# Patient Record
Sex: MALE | Race: WHITE | Employment: FULL TIME | URBAN - NONMETROPOLITAN AREA
[De-identification: names, ages, dates, MRNs, and addresses within clinical notes are randomized per-mention and may not be internally consistent; named-entity substitution may affect disease eponyms.]

---

## 2017-07-26 LAB
ALBUMIN SERPL-MCNC: 4.5 G/DL
ALP BLD-CCNC: 53 U/L
ALT SERPL-CCNC: 24 U/L
ANION GAP SERPL CALCULATED.3IONS-SCNC: NORMAL MMOL/L
AST SERPL-CCNC: 21 U/L
AVERAGE GLUCOSE: 108
BILIRUB SERPL-MCNC: 0.4 MG/DL (ref 0.1–1.4)
BUN BLDV-MCNC: 20 MG/DL
CALCIUM SERPL-MCNC: 9.4 MG/DL
CHLORIDE BLD-SCNC: 99 MMOL/L
CHOLESTEROL, TOTAL: 145 MG/DL
CHOLESTEROL/HDL RATIO: NORMAL
CO2: 25.4 MMOL/L
CREAT SERPL-MCNC: 0.8 MG/DL
GFR CALCULATED: NORMAL
GLUCOSE BLD-MCNC: 123 MG/DL
HBA1C MFR BLD: 5.4 %
HDLC SERPL-MCNC: 49 MG/DL (ref 35–70)
LDL CHOLESTEROL CALCULATED: 80 MG/DL (ref 0–160)
POTASSIUM SERPL-SCNC: 4.1 MMOL/L
PROSTATE SPECIFIC ANTIGEN: 2.27 NG/ML
SODIUM BLD-SCNC: 137 MMOL/L
TOTAL PROTEIN: 6.9
TRIGL SERPL-MCNC: 82 MG/DL
VLDLC SERPL CALC-MCNC: 16 MG/DL

## 2017-12-07 ENCOUNTER — TELEPHONE (OUTPATIENT)
Dept: UROLOGY | Age: 57
End: 2017-12-07

## 2018-01-25 ENCOUNTER — OFFICE VISIT (OUTPATIENT)
Dept: UROLOGY | Age: 58
End: 2018-01-25
Payer: COMMERCIAL

## 2018-01-25 VITALS
DIASTOLIC BLOOD PRESSURE: 58 MMHG | SYSTOLIC BLOOD PRESSURE: 114 MMHG | HEIGHT: 69 IN | BODY MASS INDEX: 33.33 KG/M2 | WEIGHT: 225 LBS

## 2018-01-25 DIAGNOSIS — R97.20 ELEVATED PSA: Primary | ICD-10-CM

## 2018-01-25 LAB
ALBUMIN SERPL-MCNC: 4.9 G/DL
ALP BLD-CCNC: 49 U/L
ALT SERPL-CCNC: 28 U/L
ANION GAP SERPL CALCULATED.3IONS-SCNC: NORMAL MMOL/L
AST SERPL-CCNC: 23 U/L
AVERAGE GLUCOSE: 120
BILIRUB SERPL-MCNC: 0.5 MG/DL (ref 0.1–1.4)
BILIRUBIN, POC: NORMAL
BLOOD URINE, POC: NORMAL
BUN BLDV-MCNC: 20 MG/DL
CALCIUM SERPL-MCNC: 9.6 MG/DL
CHLORIDE BLD-SCNC: 99 MMOL/L
CLARITY, POC: CLEAR
CO2: 27 MMOL/L
COLOR, POC: YELLOW
CREAT SERPL-MCNC: 0.8 MG/DL
GFR CALCULATED: NORMAL
GLUCOSE BLD-MCNC: 103 MG/DL
GLUCOSE URINE, POC: NORMAL
HBA1C MFR BLD: 5.8 %
KETONES, POC: NORMAL
LEUKOCYTE EST, POC: NORMAL
NITRITE, POC: NORMAL
PH, POC: 5
POST VOID RESIDUAL (PVR): 9 ML
POTASSIUM SERPL-SCNC: 4.9 MMOL/L
PROSTATE SPECIFIC ANTIGEN: 3.09 NG/ML
PROTEIN, POC: NORMAL
SODIUM BLD-SCNC: 138 MMOL/L
SPECIFIC GRAVITY, POC: 1.02
TOTAL PROTEIN: 7.1
UROBILINOGEN, POC: 0.2

## 2018-01-25 PROCEDURE — 81003 URINALYSIS AUTO W/O SCOPE: CPT | Performed by: NURSE PRACTITIONER

## 2018-01-25 PROCEDURE — 99213 OFFICE O/P EST LOW 20 MIN: CPT | Performed by: NURSE PRACTITIONER

## 2018-01-25 PROCEDURE — 51798 US URINE CAPACITY MEASURE: CPT | Performed by: NURSE PRACTITIONER

## 2018-01-25 ASSESSMENT — ENCOUNTER SYMPTOMS
VOMITING: 0
ABDOMINAL PAIN: 0
NAUSEA: 0

## 2018-01-25 NOTE — PROGRESS NOTES
Subjective:      Patient ID: Galileo Melgar is a 63 y.o. male. JEAN Portillo is here for follow-up of prostate nodule. He had a TRUS biopsy on 4/4/16 for PSA of 2.64 and prostate nodule. The pathology was negative for malignancy. The prostate size was 62.5 cc and there was a hypoechoic area on the left. There is no family history of prostate cancer. He has nocturia 2-3 times nightly. His AUA score is 3. He takes a diuretic prior to bedtime. 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 Most recent PSA is 2.27 from July 2017. Review of Systems   Constitutional: Negative for chills and fever. Gastrointestinal: Negative for abdominal pain, nausea and vomiting. Genitourinary: Negative for dysuria, frequency, hematuria and urgency. Nocturia 3 times nightly. Objective:   Physical Exam   Constitutional: He is oriented to person, place, and time. He appears well-developed and well-nourished. HENT:   Head: Normocephalic and atraumatic. Right Ear: External ear normal.   Left Ear: External ear normal.   Nose: Nose normal.   Eyes: Conjunctivae are normal.   Pulmonary/Chest: Effort normal.   Abdominal: Soft. Neurological: He is alert and oriented to person, place, and time. Skin: Skin is warm and dry. Psychiatric: He has a normal mood and affect.  His behavior is normal. Judgment and thought content normal.       Lab Results   Component Value Date    PSA 2.27 07/26/2017    PSA 2.64 03/04/2016    PSA 2.64 03/04/2016     Results for POC orders placed in visit on 01/25/18   POCT Urinalysis No Micro (Auto)   Result Value Ref Range    Color, UA yellow     Clarity, UA clear     Glucose, UA POC neg     Bilirubin, UA neg     Ketones, UA neg     Spec Grav, UA 1.020     Blood, UA POC Trace-lysed     pH, UA 5.0     Protein, UA POC neg     Urobilinogen, UA 0.2     Leukocytes, UA neg     Nitrite, UA neg        Assessment:

## 2018-02-02 ENCOUNTER — TELEPHONE (OUTPATIENT)
Dept: UROLOGY | Age: 58
End: 2018-02-02

## 2019-01-16 LAB
ALBUMIN SERPL-MCNC: 4.8 G/DL
ALP BLD-CCNC: 52 U/L
ALT SERPL-CCNC: 31 U/L
ANION GAP SERPL CALCULATED.3IONS-SCNC: NORMAL MMOL/L
AST SERPL-CCNC: 25 U/L
AVERAGE GLUCOSE: 117
BILIRUB SERPL-MCNC: 0.5 MG/DL (ref 0.1–1.4)
BUN BLDV-MCNC: 20 MG/DL
CALCIUM SERPL-MCNC: 10.1 MG/DL
CHLORIDE BLD-SCNC: 99 MMOL/L
CO2: 22 MMOL/L
CREAT SERPL-MCNC: 0.8 MG/DL
CREATININE, URINE: 157.5
GFR CALCULATED: NORMAL
GLUCOSE BLD-MCNC: 102 MG/DL
HBA1C MFR BLD: 5.7 %
MICROALBUMIN/CREAT 24H UR: NORMAL MG/G{CREAT}
MICROALBUMIN/CREAT UR-RTO: NORMAL
POTASSIUM SERPL-SCNC: 4.5 MMOL/L
PROSTATE SPECIFIC ANTIGEN: 2.53 NG/ML
SODIUM BLD-SCNC: 139 MMOL/L
TOTAL PROTEIN: 7.2

## 2019-01-24 ENCOUNTER — OFFICE VISIT (OUTPATIENT)
Dept: UROLOGY | Age: 59
End: 2019-01-24
Payer: COMMERCIAL

## 2019-01-24 VITALS
WEIGHT: 229 LBS | BODY MASS INDEX: 31.02 KG/M2 | HEIGHT: 72 IN | SYSTOLIC BLOOD PRESSURE: 128 MMHG | DIASTOLIC BLOOD PRESSURE: 72 MMHG

## 2019-01-24 DIAGNOSIS — N40.2 PROSTATE NODULE: ICD-10-CM

## 2019-01-24 DIAGNOSIS — R35.0 BENIGN PROSTATIC HYPERPLASIA WITH URINARY FREQUENCY: Primary | ICD-10-CM

## 2019-01-24 DIAGNOSIS — R35.0 URINARY FREQUENCY: ICD-10-CM

## 2019-01-24 DIAGNOSIS — N40.1 BENIGN PROSTATIC HYPERPLASIA WITH URINARY FREQUENCY: Primary | ICD-10-CM

## 2019-01-24 LAB
BILIRUBIN, POC: NORMAL
BLOOD URINE, POC: NORMAL
CLARITY, POC: CLEAR
COLOR, POC: YELLOW
GLUCOSE URINE, POC: NORMAL
KETONES, POC: NORMAL
LEUKOCYTE EST, POC: NORMAL
NITRITE, POC: NORMAL
PH, POC: 6.5
POST VOID RESIDUAL (PVR): 24 ML
PROTEIN, POC: NORMAL
SPECIFIC GRAVITY, POC: 1.01
UROBILINOGEN, POC: 0.2

## 2019-01-24 PROCEDURE — 81003 URINALYSIS AUTO W/O SCOPE: CPT | Performed by: NURSE PRACTITIONER

## 2019-01-24 PROCEDURE — 51798 US URINE CAPACITY MEASURE: CPT | Performed by: NURSE PRACTITIONER

## 2019-01-24 PROCEDURE — 99213 OFFICE O/P EST LOW 20 MIN: CPT | Performed by: NURSE PRACTITIONER

## 2019-01-24 RX ORDER — LEVOTHYROXINE SODIUM 0.12 MG/1
125 TABLET ORAL DAILY
COMMUNITY

## 2019-01-24 ASSESSMENT — ENCOUNTER SYMPTOMS
NAUSEA: 0
ABDOMINAL PAIN: 0
VOMITING: 0

## 2020-01-11 LAB — PROSTATE SPECIFIC ANTIGEN: 2.97 NG/ML

## 2020-02-05 ENCOUNTER — OFFICE VISIT (OUTPATIENT)
Dept: UROLOGY | Age: 60
End: 2020-02-05
Payer: COMMERCIAL

## 2020-02-05 VITALS
DIASTOLIC BLOOD PRESSURE: 72 MMHG | WEIGHT: 236 LBS | BODY MASS INDEX: 31.97 KG/M2 | HEIGHT: 72 IN | SYSTOLIC BLOOD PRESSURE: 132 MMHG

## 2020-02-05 LAB
BILIRUBIN, POC: NORMAL
BLOOD URINE, POC: NORMAL
CLARITY, POC: CLEAR
COLOR, POC: YELLOW
GLUCOSE URINE, POC: NORMAL
KETONES, POC: NORMAL
LEUKOCYTE EST, POC: NORMAL
NITRITE, POC: NORMAL
PH, POC: 5.5
PROTEIN, POC: NORMAL
SPECIFIC GRAVITY, POC: 1.02
UROBILINOGEN, POC: 0.2

## 2020-02-05 PROCEDURE — G8484 FLU IMMUNIZE NO ADMIN: HCPCS | Performed by: NURSE PRACTITIONER

## 2020-02-05 PROCEDURE — G8428 CUR MEDS NOT DOCUMENT: HCPCS | Performed by: NURSE PRACTITIONER

## 2020-02-05 PROCEDURE — 81003 URINALYSIS AUTO W/O SCOPE: CPT | Performed by: NURSE PRACTITIONER

## 2020-02-05 PROCEDURE — 1036F TOBACCO NON-USER: CPT | Performed by: NURSE PRACTITIONER

## 2020-02-05 PROCEDURE — G8417 CALC BMI ABV UP PARAM F/U: HCPCS | Performed by: NURSE PRACTITIONER

## 2020-02-05 PROCEDURE — 3017F COLORECTAL CA SCREEN DOC REV: CPT | Performed by: NURSE PRACTITIONER

## 2020-02-05 PROCEDURE — 99213 OFFICE O/P EST LOW 20 MIN: CPT | Performed by: NURSE PRACTITIONER

## 2020-02-05 ASSESSMENT — ENCOUNTER SYMPTOMS
ABDOMINAL PAIN: 0
BACK PAIN: 0

## 2020-02-05 NOTE — PROGRESS NOTES
[address] [address]  Dept: [phone]  Loc: [phone]    Visit Date: 2/5/2020        HPI:     Katie Bender is a 62 y.o. male who presents today for:  Chief Complaint   Patient presents with    Follow-up     prostate nodule       HPI   Pt seen in follow up for prostate nodule. Pt has a hx of prostate nodule and underwent TRUS prostate biopsy 4/2016 at which time PSA was 2.64 with pathology showing mild chronic prostatitis and was negative for malignancy. No family hx of prostate ca. Pt is not currently on any medications for BPH. Current Outpatient Medications   Medication Sig Dispense Refill    levothyroxine (SYNTHROID) 125 MCG tablet Take 125 mcg by mouth Daily       Probiotic Product (PRO-BIOTIC BLEND PO) Take by mouth      NONFORMULARY       lisinopril (PRINIVIL;ZESTRIL) 20 MG tablet Take 20 mg by mouth daily      pravastatin (PRAVACHOL) 40 MG tablet Take 40 mg by mouth daily      amLODIPine (NORVASC) 10 MG tablet Take 10 mg by mouth daily       No current facility-administered medications for this visit. Past Medical History  Chelsea Felty  has a past medical history of Hypertension and Melanoma ([facility] [address] Past Surgical History  The patient  has a past surgical history that includes Coarctation of aorta repair (1973) and Total Thyroidectomy (03/2018). Family History  This patient's family history is not on file. Social History  Chelsea Felty  reports that he has never smoked. He has never used smokeless tobacco.      Subjective:      Review of Systems   Constitutional: Negative for activity change, appetite change, chills, diaphoresis, fatigue, fever and unexpected weight change. Gastrointestinal: Negative for abdominal pain. Genitourinary: Negative for difficulty urinating, dysuria, frequency, hematuria and urgency. Musculoskeletal: Negative for back pain.        Objective:   /72

## 2020-12-22 LAB — PROSTATE SPECIFIC ANTIGEN: 3.65 NG/ML

## 2021-02-03 ENCOUNTER — OFFICE VISIT (OUTPATIENT)
Dept: UROLOGY | Age: 61
End: 2021-02-03
Payer: COMMERCIAL

## 2021-02-03 VITALS — HEIGHT: 72 IN | WEIGHT: 232 LBS | TEMPERATURE: 96.5 F | BODY MASS INDEX: 31.42 KG/M2

## 2021-02-03 DIAGNOSIS — N40.2 PROSTATE NODULE: Primary | ICD-10-CM

## 2021-02-03 LAB
AVERAGE GLUCOSE: 131
BILIRUBIN, POC: NORMAL
BLOOD URINE, POC: NORMAL
CLARITY, POC: CLEAR
COLOR, POC: YELLOW
GLUCOSE URINE, POC: NORMAL
HBA1C MFR BLD: 6.2 %
KETONES, POC: NORMAL
LEUKOCYTE EST, POC: NORMAL
NITRITE, POC: NORMAL
PH, POC: 6
PROSTATE SPECIFIC ANTIGEN: 3.64 NG/ML
PROTEIN, POC: NORMAL
SPECIFIC GRAVITY, POC: 1.02
UROBILINOGEN, POC: 0.2

## 2021-02-03 PROCEDURE — G8484 FLU IMMUNIZE NO ADMIN: HCPCS | Performed by: NURSE PRACTITIONER

## 2021-02-03 PROCEDURE — 81003 URINALYSIS AUTO W/O SCOPE: CPT | Performed by: NURSE PRACTITIONER

## 2021-02-03 PROCEDURE — G8427 DOCREV CUR MEDS BY ELIG CLIN: HCPCS | Performed by: NURSE PRACTITIONER

## 2021-02-03 PROCEDURE — 1036F TOBACCO NON-USER: CPT | Performed by: NURSE PRACTITIONER

## 2021-02-03 PROCEDURE — 3017F COLORECTAL CA SCREEN DOC REV: CPT | Performed by: NURSE PRACTITIONER

## 2021-02-03 PROCEDURE — 99214 OFFICE O/P EST MOD 30 MIN: CPT | Performed by: NURSE PRACTITIONER

## 2021-02-03 PROCEDURE — G8417 CALC BMI ABV UP PARAM F/U: HCPCS | Performed by: NURSE PRACTITIONER

## 2021-02-03 ASSESSMENT — ENCOUNTER SYMPTOMS
BACK PAIN: 0
ABDOMINAL PAIN: 0

## 2021-02-03 NOTE — PROGRESS NOTES
[address] [address]  Dept: [phone]  Loc: [phone]    Visit Date: 2/3/2021        HPI:     Barbra Jerez is a 62 y.o. male who presents today for:  Chief Complaint   Patient presents with    Follow-up     Prostate nodule/psa prior       HPI   Pt seen in follow up for prostate nodule. Pt has a hx of prostate nodule and underwent TRUS prostate biopsy 4/2016 at which time PSA was 2.64 with pathology showing mild chronic prostatitis and was negative for malignancy. No family hx of prostate ca. Noted hx of breast ca in mother and sister. Pt is not currently on any medications for BPH. Denies irritative voiding symptoms. UA neg. Current Outpatient Medications   Medication Sig Dispense Refill    levothyroxine (SYNTHROID) 125 MCG tablet Take 125 mcg by mouth Daily       Probiotic Product (PRO-BIOTIC BLEND PO) Take by mouth      NONFORMULARY       lisinopril (PRINIVIL;ZESTRIL) 20 MG tablet Take 20 mg by mouth daily      pravastatin (PRAVACHOL) 40 MG tablet Take 40 mg by mouth daily      amLODIPine (NORVASC) 10 MG tablet Take 10 mg by mouth daily       No current facility-administered medications for this visit. Past Medical History  Sonja Luong  has a past medical history of Hypertension and Melanoma ([facility] [address] Past Surgical History  The patient  has a past surgical history that includes Coarctation of aorta repair (1973) and Total Thyroidectomy (03/2018). Family History  This patient's family history is not on file. Social History  Sonja Luong  reports that he has never smoked. He has never used smokeless tobacco.      Subjective:      Review of Systems   Constitutional: Negative for activity change, appetite change, chills, diaphoresis, fatigue, fever and unexpected weight change. Gastrointestinal: Negative for abdominal pain.    Genitourinary: Negative for difficulty urinating, dysuria, frequency, hematuria and urgency. Musculoskeletal: Negative for back pain. Objective:   Temp 96.5 °F (35.8 °C)   Ht 6' (1.829 m)   Wt 232 lb (105.2 kg)   BMI 31.46 kg/m²     Physical Exam  Constitutional:       General: He is not in acute distress. Appearance: Normal appearance. He is not ill-appearing or diaphoretic. HENT:      Head: Normocephalic and atraumatic. Eyes:      General: No scleral icterus. Right eye: No discharge. Left eye: No discharge. Cardiovascular:      Rate and Rhythm: Normal rate and regular rhythm. Pulmonary:      Effort: Pulmonary effort is normal. No respiratory distress. Abdominal:      General: There is no distension. Tenderness: There is no abdominal tenderness. Genitourinary:     Comments: Prostate moderately enlarged. Left apex firm and nodular to palpation  Musculoskeletal: Normal range of motion. Skin:     General: Skin is warm and dry. Capillary Refill: Capillary refill takes less than 2 seconds. Neurological:      General: No focal deficit present. Mental Status: He is alert and oriented to person, place, and time.    Psychiatric:         Mood and Affect: Mood normal.         Behavior: Behavior normal.         POC  Results for POC orders placed in visit on 02/03/21   POCT Urinalysis No Micro (Auto)   Result Value Ref Range    Color, UA yellow     Clarity, UA clear     Glucose, UA POC neg     Bilirubin, UA neg     Ketones, UA neg     Spec Grav, UA 1.025     Blood, UA POC neg     pH, UA 6.0     Protein, UA POC neg     Urobilinogen, UA 0.2     Leukocytes, UA neg     Nitrite, UA neg          Patients recent PSA values are as follows  Lab Results   Component Value Date    PSA 3.65 12/22/2020    PSA 2.97 01/11/2020    PSA 2.53 01/16/2019        Recent BUN/Creatinine:  Lab Results   Component Value Date    BUN 20 01/16/2019    CREATININE 0.8 01/16/2019     FINAL DIAGNOSIS:  A-F.  Prostate, right apex, right mid, right base, left apex, left mid  and left base, multiple core needle biopsies:   Mild chronic prostatitis.   Negative for malignancy. Confirm Mdx 1/2018 Negative    Assessment:   Prostate nodule  Elevated PSA  Family hx of breast ca in mother and sister    Plan:     Pt continues with prostate nodule to left apex. No new findings on JYOTI today. Prior biopsy negative. PSA trending up to 3.65. Check MRI of the prostate to further evaluate--call results. Timing of next appt dependent on results.

## 2021-02-15 ENCOUNTER — HOSPITAL ENCOUNTER (OUTPATIENT)
Dept: MRI IMAGING | Age: 61
Discharge: HOME | End: 2021-02-15
Payer: COMMERCIAL

## 2021-02-15 DIAGNOSIS — N40.2 PROSTATE NODULE: ICD-10-CM

## 2021-02-15 LAB — POC CREATININE WHOLE BLOOD: 0.8 MG/DL (ref 0.5–1.2)

## 2021-02-15 PROCEDURE — 82565 ASSAY OF CREATININE: CPT

## 2021-02-15 PROCEDURE — 76377 3D RENDER W/INTRP POSTPROCES: CPT

## 2021-02-15 PROCEDURE — 6360000004 HC RX CONTRAST MEDICATION: Performed by: NURSE PRACTITIONER

## 2021-02-15 PROCEDURE — A9579 GAD-BASE MR CONTRAST NOS,1ML: HCPCS | Performed by: NURSE PRACTITIONER

## 2021-02-15 RX ADMIN — GADOTERIDOL 20 ML: 279.3 INJECTION, SOLUTION INTRAVENOUS at 18:01

## 2021-02-16 ENCOUNTER — TELEPHONE (OUTPATIENT)
Dept: UROLOGY | Age: 61
End: 2021-02-16

## 2021-02-16 RX ORDER — GENTAMICIN SULFATE 40 MG/ML
80 INJECTION, SOLUTION INTRAMUSCULAR; INTRAVENOUS ONCE
Qty: 2 ML | Refills: 0 | Status: SHIPPED | OUTPATIENT
Start: 2021-02-16 | End: 2021-02-16

## 2021-02-16 RX ORDER — CIPROFLOXACIN 500 MG/1
500 TABLET, FILM COATED ORAL 2 TIMES DAILY
Qty: 6 TABLET | Refills: 0 | Status: SHIPPED | OUTPATIENT
Start: 2021-02-16 | End: 2021-02-22

## 2021-02-16 NOTE — TELEPHONE ENCOUNTER
Called and discussed results of MRI with Iline Apgar. Noted PIRADS 3 lesion in left para midline peripheral gland measuring 18 x 10 mm in size. PSA trending up and Iline Apgar has a left apex nodule on JYOTI. Recommend proceeding with MRI fusion prostate biopsy at this time and Iline Apgar is agreeable. We will schedule Blanca Kirkpatrick for a prostate biopsy in the office by Dr. Charlee Morton in the next couple weeks. He was given a prescription for Cipro 500 mg to be taken BID the day before, day of, and day after the biopsy. Gentamicin was sent to his pharmacy. He will bring this to the biopsy appointment for IM injection. He will do an enema the morning of the biopsy. He will stop all anti-coagulants a few days prior to the biopsy. The patient is aware to seek treatment if he would develop a fever or chills the night of the biopsy. He will return a few weeks after the biopsy for results and further recommendations. Office to facilitate scheduling.

## 2021-02-17 ENCOUNTER — TELEPHONE (OUTPATIENT)
Dept: UROLOGY | Age: 61
End: 2021-02-17

## 2021-02-17 NOTE — TELEPHONE ENCOUNTER
PROSTATE ULTRASOUND/MRI GUIDED BIOPSY WITH DR. Gurpreet Fisher      1960    You are scheduled for the above procedure on:    03-   at:    9:00am- Please arrive 15-minutes early    Your follow up appointment for your biopsy results is on:    03-     At:   3:15pm with Dr. Gulshan Drummond    Please note that you will be responsible for any charges that are not paid by your insurance. The prostate biopsy specimens are sent to a Lab and their charges are billed to you separate from the office charges. DESCRIPTION OF PROSTATIC ULTRASOUND AND BIOPSY  Ultrasound uses harmless sound waves to give us pictures of the prostate, and allows us to accurately guide a biopsy needle to areas of concern. The procedure is done in the office. Initially, a complete prostate exam is done. Next the ultrasound probe, finger-like in size and shape, is placed into the rectum. With slight movement of the probe, many different views are obtained. A prostate block may or may not be given at this time. A spring loaded fine needle is place through the probe and directed into the prostate. Six or more biopsies are usually taken. These biopsies are not usually painful. The entire exam takes 20-30 minutes. POSSIBLE RISKS OF THE PROCEDURE  Blood may be noted in the stool and urine for a few days. Blood may be seen in the semen for up to a month. Infection of the prostate or in the urine can occur even with antibiotic preparation. You should call if you develop fever, chills, severe pain, or have continuous or significant bleeding. If you have known hemorrhoids, you may have more bleeding and discomfort in the rectal area following the biopsy. This may last for 3-5 days afterwards. If any concerns arise, please call the office. PREPARATION  1.  DO NOT TAKE: ASPIRIN, MOTRIN, PLAVIX, IBUPROFEN, MOBIC/ MELOXICAM, COUMADIN, FISH OIL, AND VITAMIN E FOR 5 DAYS BEFORE THE BIOPSY AND 3 DAYS AFTER THE BIOPSY.   YOU MAY TAKE TYLENOL IF NEEDED  2. Please eat a regular breakfast/lunch. 3.  Take your antibiotics as directed:  Cipro 500 mg twice a day, take one in the morning and one in the evening, start on:   03-   take until finished. 4.  Bring your Gentamicin 80 mg with you to your appointment. 5.  Do a Fleets Enema 2 hours before the visit. Purchase it at any drug store and follow the instructions on the package. 6. Please ensure to bring a  with you the day of the surgery to transport you home. HOME GOING AND FOLLOW UP INSTRUCTIONS  Call the doctor if: 1. There is a large amount of blood or clots in the urine or stool. 2.  You are unable to urinate. 3.  If your temperature is 101 degrees F or greater. 4.  You could see blood in your semen for up to 2-months            5.   You may resume your regular medication 3-days after procedure    DATE: 2/17/2021       SIGNATURE:________________________________

## 2021-02-17 NOTE — TELEPHONE ENCOUNTER
Patient scheduled with Dr. Suresh Sessions for his Carlos Annro biopsy on 03- at 9:00am.  Patient was advised that if we get a cancellation will move his appointment up.

## 2021-02-26 ENCOUNTER — PROCEDURE VISIT (OUTPATIENT)
Dept: UROLOGY | Age: 61
End: 2021-02-26
Payer: COMMERCIAL

## 2021-02-26 VITALS — TEMPERATURE: 97.1 F | BODY MASS INDEX: 30.69 KG/M2 | HEIGHT: 72 IN | WEIGHT: 226.6 LBS

## 2021-02-26 DIAGNOSIS — N40.2 PROSTATE NODULE: Primary | ICD-10-CM

## 2021-02-26 PROCEDURE — 76872 US TRANSRECTAL: CPT | Performed by: UROLOGY

## 2021-02-26 PROCEDURE — 96372 THER/PROPH/DIAG INJ SC/IM: CPT | Performed by: UROLOGY

## 2021-02-26 PROCEDURE — 55700 PR BIOPSY OF PROSTATE,NEEDLE/PUNCH: CPT | Performed by: UROLOGY

## 2021-02-26 RX ORDER — GENTAMICIN SULFATE 40 MG/ML
80 INJECTION, SOLUTION INTRAMUSCULAR; INTRAVENOUS ONCE
Status: COMPLETED | OUTPATIENT
Start: 2021-02-26 | End: 2021-02-26

## 2021-02-26 RX ADMIN — GENTAMICIN SULFATE 80 MG: 40 INJECTION, SOLUTION INTRAMUSCULAR; INTRAVENOUS at 08:49

## 2021-02-26 NOTE — PROGRESS NOTES
Procedure: Trus/Biopsy  Pt name and birth date verified Yes  Patient agrees Dr. Juany Martin is taking biopsies of the prostate Yes  Patient took Enema 2 hours prior to procedure Yes  Patient took 2 pill(s) of Cipro day before procedure, day of, and will the day after Yes  Has patient eaten today? Yes  Patient stopped all blood thinners prior to surgery Yes      Patient has given me verbal consent to perform Gentamicin Injection Yes    Following Dr. Sascha Barrientos of [facility].   Gentamicin 80MG/2ML GIVEN I.M right UOQ HIP  Lot Number: [id number]  Expiration Date: 11/21  [facility] #: [id number]    Patient supplied their own medications Yes

## 2021-03-02 ENCOUNTER — OFFICE VISIT (OUTPATIENT)
Dept: UROLOGY | Age: 61
End: 2021-03-02
Payer: COMMERCIAL

## 2021-03-02 VITALS — HEIGHT: 72 IN | WEIGHT: 226 LBS | TEMPERATURE: 97.3 F | BODY MASS INDEX: 30.61 KG/M2

## 2021-03-02 DIAGNOSIS — R97.20 ELEVATED PSA: Primary | ICD-10-CM

## 2021-03-02 DIAGNOSIS — N40.1 BENIGN LOCALIZED PROSTATIC HYPERPLASIA WITH LOWER URINARY TRACT SYMPTOMS (LUTS): ICD-10-CM

## 2021-03-02 PROCEDURE — 1036F TOBACCO NON-USER: CPT | Performed by: UROLOGY

## 2021-03-02 PROCEDURE — 3017F COLORECTAL CA SCREEN DOC REV: CPT | Performed by: UROLOGY

## 2021-03-02 PROCEDURE — 99214 OFFICE O/P EST MOD 30 MIN: CPT | Performed by: UROLOGY

## 2021-03-02 PROCEDURE — G8484 FLU IMMUNIZE NO ADMIN: HCPCS | Performed by: UROLOGY

## 2021-03-02 PROCEDURE — G8427 DOCREV CUR MEDS BY ELIG CLIN: HCPCS | Performed by: UROLOGY

## 2021-03-02 PROCEDURE — G8417 CALC BMI ABV UP PARAM F/U: HCPCS | Performed by: UROLOGY

## 2021-03-02 RX ORDER — TAMSULOSIN HYDROCHLORIDE 0.4 MG/1
0.4 CAPSULE ORAL DAILY
Qty: 90 CAPSULE | Refills: 3 | Status: SHIPPED | OUTPATIENT
Start: 2021-03-02 | End: 2022-02-16 | Stop reason: SDUPTHER

## 2021-03-02 NOTE — PROGRESS NOTES
[place]        [address] [facility] [id number]  Dept: [phone]  Dept Fax: [phone]: [address] [address] Urology Office Note      Patient:  Altagracia Beckman  YOB: 1960    The patient is a 62 y.o. male who presents today for evaluation of the following problems:   Chief Complaint   Patient presents with    Follow-up     Biopsy results        HISTORY OF PRESENT ILLNESS:     Prostate Nodule/Elevated PSA  Here for prostate bx results    FINAL DIAGNOSIS:   A-C. Prostate, right apex, mid, and base, biopsies:    Negative for malignancy. D-F. Prostate, left apex, mid, and base, biopsies:    Negative for malignancy. G. Prostate, lesion #1, biopsy:    Negative for malignancy. BPH  100g prostate  Not on any meds. Some bother        Summary of Previous Records:  HPI   Pt seen in follow up for prostate nodule. Pt has a hx of prostate nodule and underwent TRUS prostate biopsy 4/2016 at which time PSA was 2.64 with pathology showing mild chronic prostatitis and was negative for malignancy. No family hx of prostate ca. Noted hx of breast ca in mother and sister. Pt is not currently on any medications for BPH. Denies irritative voiding symptoms. UA neg. Requested/reviewed records from [facility] office and/or outside physician/EMR    (Patient's old records have been requested, reviewed and pertinent findings summarized in today's note.)    Procedures Today: N/A    Last several PSA's:  Lab Results   Component Value Date    PSA 3.64 02/03/2021    PSA 3.65 12/22/2020    PSA 2.97 01/11/2020       Last total testosterone:  No results found for: TESTOSTERONE    Urinalysis today:  No results found for this visit on 03/02/21.     Last BUN and creatinine:  Lab Results   Component Value Date    BUN 20 01/16/2019     Lab Results   Component Value Date    CREATININE 0.8 01/16/2019       Imaging Reviewed during this Office Visit:   Aldo Reza MD independently reviewed the images and verified the radiology reports from:    Mri Prostate W Wo Contrast    Result Date: 2/16/2021  PROCEDURE: MRI PROSTATE W R [address] INFORMATIONProstate nodule. COMPARISON: No prior study. TECHNIQUE: Multisequence multiplanar imaging of the prostate was performed with and without contrast. FINDINGS: Prostate: The prostate measures 5.1 x 4.2 x 5.9 cm for total calculated volume of 65.7 mL. Peripheral gland: There is a diffusion restricting, T2 hypointense, enhancing lesion of the left para midline peripheral gland measuring 18 x 10 mm series 4 image 15 and series 6 image 11. There is no extracapsular extension. Central gland: Multiple small nodules consistent with benign prostatic hypertrophy. Seminal vesicles have an unremarkable appearance. Other: No pelvic adenopathy. Imaged urinary bladder is unremarkable. Bone marrow signal is normal throughout. Fat-containing bilateral inguinal hernias. Focal lesion within the left paramidline peripheral zone as described above, PI-RADS 3. Sequelae of benign prostatic hypertrophy. **This report has been created using voice recognition software. It may contain minor errors which are inherent in voice recognition technology. ** Final report electronically signed by Dr. Matheus Palencia on 2/16/2021 1:34 PM      PAST MEDICAL, FAMILY AND SOCIAL HISTORY:  Past Medical History:   Diagnosis Date    Hypertension     Melanoma ([facility] [address] 03/2019    excision of back     Past Surgical History:   Procedure Laterality Date    COARCTATION OF AORTA REPAIR  1973    TOTAL THYROIDECTOMY  03/2018     No family history on file.   Outpatient Medications Marked as Taking for the 3/2/21 encounter (Office Visit) with David Macdonald MD   Medication Sig Dispense Refill    tamsulosin (FLOMAX) 0.4 MG capsule Take 1 capsule by mouth daily 90 capsule 3    levothyroxine (SYNTHROID) 125 MCG tablet Take 125 mcg by mouth Daily       Probiotic Product (PRO-BIOTIC BLEND PO) Take by mouth      NONFORMULARY       lisinopril (PRINIVIL;ZESTRIL) 20 MG tablet Take 20 mg by mouth daily      pravastatin (PRAVACHOL) 40 MG tablet Take 40 mg by mouth daily      amLODIPine (NORVASC) 10 MG tablet Take 10 mg by mouth daily         Pcn [penicillins]  Social History     Tobacco Use   Smoking Status Never Smoker   Smokeless Tobacco Never Used      (If patient a smoker, smoking cessation counseling offered)   Social History     Substance and Sexual Activity   Alcohol Use None       REVIEW OF SYSTEMS:  Constitutional: negative  Eyes: negative  Respiratory: negative  Cardiovascular: negative  Gastrointestinal: negative  Genitourinary: see HPI  Musculoskeletal: negative  Skin: negative   Neurological: negative  Hematological/Lymphatic: negative  Psychological: negative        Physical Exam:    This a 62 y.o. male  Vitals:    03/02/21 1549   Temp: 97.3 °F (36.3 °C)     Body mass index is 30.65 kg/m². Constitutional: Patient in no acute distress;         Assessment and Plan        1. Elevated PSA    2.  Benign localized prostatic hyperplasia with lower urinary tract symptoms (LUTS)               Plan:      No cancer noted on biopsy- discussed results with patient today  Return in six months with Margot Nevarezy - PSA prior  Start flomax for slight worsening BPH- > 100 g prostate          Prescriptions Ordered:  Orders Placed This Encounter   Medications    tamsulosin (FLOMAX) 0.4 MG capsule     Sig: Take 1 capsule by mouth daily     Dispense:  90 capsule     Refill:  3      Orders Placed:  Orders Placed This Encounter   Procedures    PSA Prostatic Specific Antigen     Standing Status:   Future     Standing Expiration Date:   3/2/2022            Jessie Castillo MD

## 2021-06-14 LAB
ALBUMIN SERPL-MCNC: 5 G/DL
ALP BLD-CCNC: 56 U/L
ALT SERPL-CCNC: 28 U/L
ANION GAP SERPL CALCULATED.3IONS-SCNC: NORMAL MMOL/L
AST SERPL-CCNC: 20 U/L
AVERAGE GLUCOSE: 126
BILIRUB SERPL-MCNC: NORMAL MG/DL
BUN BLDV-MCNC: 18 MG/DL
CALCIUM SERPL-MCNC: 10.2 MG/DL
CHLORIDE BLD-SCNC: 102 MMOL/L
CHOLESTEROL, TOTAL: 163 MG/DL
CHOLESTEROL/HDL RATIO: NORMAL
CO2: 28 MMOL/L
CREAT SERPL-MCNC: 0.7 MG/DL
CREATININE, URINE: 102.2
GFR CALCULATED: NORMAL
GLUCOSE BLD-MCNC: 129 MG/DL
HBA1C MFR BLD: 6 %
HDLC SERPL-MCNC: 50 MG/DL (ref 35–70)
LDL CHOLESTEROL CALCULATED: 92 MG/DL (ref 0–160)
MICROALBUMIN/CREAT 24H UR: NORMAL MG/G{CREAT}
MICROALBUMIN/CREAT UR-RTO: NORMAL
NONHDLC SERPL-MCNC: NORMAL MG/DL
POTASSIUM SERPL-SCNC: 4.5 MMOL/L
PROSTATE SPECIFIC ANTIGEN: 4.37 NG/ML
SODIUM BLD-SCNC: 140 MMOL/L
TOTAL PROTEIN: 7.4
TRIGL SERPL-MCNC: 105 MG/DL
VLDLC SERPL CALC-MCNC: 21 MG/DL

## 2021-09-08 LAB — PROSTATE SPECIFIC ANTIGEN: 4.19 NG/ML

## 2021-09-15 ENCOUNTER — VIRTUAL VISIT (OUTPATIENT)
Dept: UROLOGY | Age: 61
End: 2021-09-15
Payer: COMMERCIAL

## 2021-09-15 DIAGNOSIS — R97.20 ELEVATED PSA: Primary | ICD-10-CM

## 2021-09-15 PROCEDURE — 99441 PR PHYS/QHP TELEPHONE EVALUATION 5-10 MIN: CPT | Performed by: NURSE PRACTITIONER

## 2021-09-15 NOTE — PROGRESS NOTES
Ang Ramirez is a 62 y.o. male evaluated via telephone on 9/15/2021. Consent:  He and/or health care decision maker is aware that that he may receive a bill for this telephone service, depending on his insurance coverage, and has provided verbal consent to proceed: Yes      Documentation:  I communicated with the patient and/or health care decision maker about prostate nodule  Details of this discussion including any medical advice provided:     Yolanda Williamson has a hx of a left apex prostate nodule and PIRADS 3 lesion on MRI for which he underwent MRI fusion prostate biopsy on 2/26/21 by Dr. Linda Mata. PSA 3.64 at the time of biopsy. Pathology was negative for malignancy. Pt also underwent TRUS prostate biopsy 4/2016 for PSA of 2.64 with pathology showing mild chronic prostatitis and negative for malignancy. No family hx of prostate ca. Noted hx of breast ca in mother and sister. No currently on any medications for BPH. Reports urinary symptoms stable. Notes he is doing well. No new complaints. Pt's PSA 9/8/21 4. 19. Repeat PSA in 3 months when due for labs with PCP. F/u in January to review results. If stable and trending down at that point may change to checking yearly. I affirm this is a Patient Initiated Episode with a Patient who has not had a related appointment within my department in the past 7 days or scheduled within the next 24 hours. Patient identification was verified at the start of the visit: Yes    Total Time: minutes: 5-10 minutes    The visit was conducted pursuant to the emergency declaration under the [facility], [address] [place] [address] authority and the [employer] General Act. Patient identification was verified, and a caregiver was present when appropriate. The patient was located in a state where the provider was credentialed to provide care.     Note: not billable if this call serves to triage the patient into an appointment for the relevant concern      SANCHEZ Holm - CNP

## 2021-12-13 LAB
ALBUMIN SERPL-MCNC: 4.5 G/DL
ALP BLD-CCNC: 55 U/L
ALT SERPL-CCNC: 29 U/L
ANION GAP SERPL CALCULATED.3IONS-SCNC: NORMAL MMOL/L
AST SERPL-CCNC: 19 U/L
AVERAGE GLUCOSE: 123
BILIRUB SERPL-MCNC: NORMAL MG/DL
BUN BLDV-MCNC: 17 MG/DL
CALCIUM SERPL-MCNC: 9.5 MG/DL
CHLORIDE BLD-SCNC: 102 MMOL/L
CO2: 26 MMOL/L
CREAT SERPL-MCNC: 0.8 MG/DL
GFR CALCULATED: NORMAL
GLUCOSE BLD-MCNC: 102 MG/DL
HBA1C MFR BLD: 5.9 %
POTASSIUM SERPL-SCNC: 4.4 MMOL/L
PROSTATE SPECIFIC ANTIGEN: 4.15 NG/ML
SODIUM BLD-SCNC: 138 MMOL/L
T4 TOTAL: NORMAL
TOTAL PROTEIN: 7
TSH SERPL DL<=0.05 MIU/L-ACNC: 2.23 UIU/ML

## 2022-02-16 ENCOUNTER — OFFICE VISIT (OUTPATIENT)
Dept: UROLOGY | Age: 62
End: 2022-02-16
Payer: COMMERCIAL

## 2022-02-16 VITALS
HEIGHT: 72 IN | WEIGHT: 231 LBS | DIASTOLIC BLOOD PRESSURE: 72 MMHG | BODY MASS INDEX: 31.29 KG/M2 | SYSTOLIC BLOOD PRESSURE: 122 MMHG

## 2022-02-16 DIAGNOSIS — R97.20 ELEVATED PSA: Primary | ICD-10-CM

## 2022-02-16 LAB
BILIRUBIN, POC: NORMAL
BLOOD URINE, POC: NORMAL
CLARITY, POC: CLEAR
COLOR, POC: YELLOW
GLUCOSE URINE, POC: NORMAL
KETONES, POC: NORMAL
LEUKOCYTE EST, POC: NORMAL
NITRITE, POC: NORMAL
PH, POC: 5.5
PROTEIN, POC: NORMAL
SPECIFIC GRAVITY, POC: 1.01
UROBILINOGEN, POC: 0.2

## 2022-02-16 PROCEDURE — 81003 URINALYSIS AUTO W/O SCOPE: CPT | Performed by: NURSE PRACTITIONER

## 2022-02-16 PROCEDURE — 99214 OFFICE O/P EST MOD 30 MIN: CPT | Performed by: NURSE PRACTITIONER

## 2022-02-16 RX ORDER — DOXYCYCLINE HYCLATE 100 MG
100 TABLET ORAL 2 TIMES DAILY
Qty: 60 TABLET | Refills: 0 | Status: SHIPPED | OUTPATIENT
Start: 2022-02-16 | End: 2022-03-18

## 2022-02-16 RX ORDER — TAMSULOSIN HYDROCHLORIDE 0.4 MG/1
0.4 CAPSULE ORAL DAILY
Qty: 90 CAPSULE | Refills: 4 | Status: SHIPPED | OUTPATIENT
Start: 2022-02-16

## 2022-02-16 ASSESSMENT — ENCOUNTER SYMPTOMS
BACK PAIN: 0
ABDOMINAL PAIN: 0
VOMITING: 0
NAUSEA: 0

## 2022-02-16 NOTE — PROGRESS NOTES
[address] [address]  Dept: [phone]  Loc: [phone]    Visit Date: 2/16/2022        HPI:     Kaden Leo is a 65 y.o. male who presents today for:  Chief Complaint   Patient presents with    Follow-up    Elevated PSA       HPI   Pt seen in follow up for BPH and elevated psa. Wyatt Fam has a hx of a left apex prostate nodule and PIRADS 3 lesion on MRI for which he underwent MRI fusion prostate biopsy on 2/26/21 by Dr. Maeve Husain. PSA 3.64 at the time of biopsy. Pathology was negative for malignancy.       Pt also underwent TRUS prostate biopsy 4/2016 for PSA of 2.64 with pathology showing mild chronic prostatitis and negative for malignancy. No family hx of prostate ca. Noted hx of breast ca in mother and sister.       Pt has 100 gram prostate and was started on tamsulosin 3/2021 by Dr. Maeve Husain. Reports urinary symptoms stable. Notes nocturia down to 2-3 x per night from 4-5 previously. Current Outpatient Medications   Medication Sig Dispense Refill    tamsulosin (FLOMAX) 0.4 MG capsule Take 1 capsule by mouth daily 90 capsule 3    levothyroxine (SYNTHROID) 125 MCG tablet Take 125 mcg by mouth Daily       Probiotic Product (PRO-BIOTIC BLEND PO) Take by mouth      NONFORMULARY       lisinopril (PRINIVIL;ZESTRIL) 20 MG tablet Take 20 mg by mouth daily      pravastatin (PRAVACHOL) 40 MG tablet Take 40 mg by mouth daily      amLODIPine (NORVASC) 10 MG tablet Take 10 mg by mouth daily       No current facility-administered medications for this visit. Past Medical History  Cite Daquangeovany Benedict  has a past medical history of Hypertension and Melanoma ([facility] [address] Past Surgical History  The patient  has a past surgical history that includes Coarctation of aorta repair (1973); Total Thyroidectomy (03/2018); and Skin cancer excision. Family History  This patient's family history is not on file.     Social History  Kaden Roberts  reports that he has never smoked. He has never used smokeless tobacco.      Subjective:      Review of Systems   Constitutional: Negative for activity change, appetite change, chills, diaphoresis, fatigue, fever and unexpected weight change. Gastrointestinal: Negative for abdominal pain, nausea and vomiting. Genitourinary: Negative for decreased urine volume, difficulty urinating, dysuria, flank pain, frequency, hematuria and urgency. Musculoskeletal: Negative for back pain. Objective:   /72   Ht 6' (1.829 m)   Wt 231 lb (104.8 kg)   BMI 31.33 kg/m²     Physical Exam  Vitals reviewed. Constitutional:       General: He is not in acute distress. Appearance: Normal appearance. He is well-developed. He is not ill-appearing or diaphoretic. HENT:      Head: Normocephalic and atraumatic. Right Ear: External ear normal.      Left Ear: External ear normal.      Nose: Nose normal.      Mouth/Throat:      Mouth: Mucous membranes are moist.   Eyes:      General: No scleral icterus. Right eye: No discharge. Left eye: No discharge. Neck:      Vascular: No JVD. Trachea: No tracheal deviation. Pulmonary:      Effort: Pulmonary effort is normal. No respiratory distress. Musculoskeletal:         General: Normal range of motion. Neurological:      Mental Status: He is alert and oriented to person, place, and time. Mental status is at baseline. Psychiatric:         Mood and Affect: Mood normal.         Behavior: Behavior normal.         Thought Content: Thought content normal.         POC  No results found for this visit on 02/16/22.       Patients recent PSA values are as follows  Lab Results   Component Value Date    PSA 4.19 09/08/2021    PSA 4.37 06/14/2021    PSA 3.64 02/03/2021        Recent BUN/Creatinine:  Lab Results   Component Value Date    BUN 18 06/14/2021    CREATININE 0.7 06/14/2021       Assessment:   Elevated PSA  BPH    Plan:     LUTS

## 2022-03-25 LAB
ALBUMIN SERPL-MCNC: 4.9 G/DL
ALP BLD-CCNC: 61 U/L
ALT SERPL-CCNC: 27 U/L
ANION GAP SERPL CALCULATED.3IONS-SCNC: 14 MMOL/L
AST SERPL-CCNC: 21 U/L
BILIRUB SERPL-MCNC: 0.3 MG/DL (ref 0.1–1.4)
BUN BLDV-MCNC: 20 MG/DL
CALCIUM SERPL-MCNC: 10.1 MG/DL
CHLORIDE BLD-SCNC: 99 MMOL/L
CO2: 25 MMOL/L
CREAT SERPL-MCNC: 0.8 MG/DL
GFR CALCULATED: NORMAL
GLUCOSE BLD-MCNC: 116 MG/DL
POTASSIUM SERPL-SCNC: 4.5 MMOL/L
PROSTATE SPECIFIC ANTIGEN: 4.83 NG/ML
SODIUM BLD-SCNC: 138 MMOL/L
TOTAL PROTEIN: 7

## 2022-04-20 ENCOUNTER — OFFICE VISIT (OUTPATIENT)
Dept: UROLOGY | Age: 62
End: 2022-04-20
Payer: COMMERCIAL

## 2022-04-20 VITALS — BODY MASS INDEX: 31.56 KG/M2 | WEIGHT: 233 LBS | HEIGHT: 72 IN

## 2022-04-20 DIAGNOSIS — R97.20 ELEVATED PSA: Primary | ICD-10-CM

## 2022-04-20 PROCEDURE — 99213 OFFICE O/P EST LOW 20 MIN: CPT | Performed by: NURSE PRACTITIONER

## 2022-04-20 ASSESSMENT — ENCOUNTER SYMPTOMS
NAUSEA: 0
VOMITING: 0
ABDOMINAL PAIN: 0
BACK PAIN: 0

## 2022-04-20 NOTE — PROGRESS NOTES
[address] [place] [address]  [address] [facility] [address] [place] [address]  Dept: [phone]  Loc: [phone]    Visit Date: 4/20/2022        HPI:     Suki Steve is a 65 y.o. male who presents today for:  Chief Complaint   Patient presents with    Follow-up     psa prior    Elevated PSA       HPI   Pt seen in follow up for BPH and elevated psa.      Mk Du has a hx of a left apex prostate nodule and PIRADS 3 lesion on MRI for which he underwent MRI fusion prostate biopsy on 2/26/21 by Dr. Gulshan Matamoros.  PSA 3.64 at the time of biopsy.  Pathology was negative for malignancy.       Pt also underwent TRUS prostate biopsy 4/2016 for PSA of 2.64 with pathology showing mild chronic prostatitis and negative for malignancy.  No family hx of prostate ca.  Noted hx of breast ca in mother and sister.       Pt has 100 gram prostate and was started on tamsulosin 3/2021 by Dr. Gulshan Matamoros. Reports urinary symptoms stable.  Notes nocturia to 2-3 x per night from 4-5 previously. Current Outpatient Medications   Medication Sig Dispense Refill    tamsulosin (FLOMAX) 0.4 MG capsule Take 1 capsule by mouth daily 90 capsule 4    levothyroxine (SYNTHROID) 125 MCG tablet Take 125 mcg by mouth Daily       Probiotic Product (PRO-BIOTIC BLEND PO) Take by mouth      NONFORMULARY       lisinopril (PRINIVIL;ZESTRIL) 20 MG tablet Take 20 mg by mouth daily      pravastatin (PRAVACHOL) 40 MG tablet Take 40 mg by mouth daily      amLODIPine (NORVASC) 10 MG tablet Take 10 mg by mouth daily       No current facility-administered medications for this visit. Past Medical History  Ritu James  has a past medical history of Hypertension and Melanoma ([facility] [address] Past Surgical History  The patient  has a past surgical history that includes Coarctation of aorta repair (1973); Total Thyroidectomy (03/2018); Skin cancer excision; and shoulder surgery (Left, 03/01/2022).     Family History  This patient's family history is not on file. Social History  [facility]  reports that he has never smoked. He has never used smokeless tobacco.      Subjective:      Review of Systems   Constitutional: Negative for activity change, appetite change, chills, diaphoresis, fatigue, fever and unexpected weight change. Gastrointestinal: Negative for abdominal pain, nausea and vomiting. Genitourinary: Negative for decreased urine volume, difficulty urinating, dysuria, flank pain, frequency, hematuria and urgency. Musculoskeletal: Negative for back pain. Objective:   Ht 6' (1.829 m)   Wt 233 lb (105.7 kg)   BMI 31.60 kg/m²     Physical Exam  Vitals reviewed. Constitutional:       General: He is not in acute distress. Appearance: Normal appearance. He is well-developed. He is not ill-appearing or diaphoretic. HENT:      Head: Normocephalic and atraumatic. Right Ear: External ear normal.      Left Ear: External ear normal.      Nose: Nose normal.      Mouth/Throat:      Mouth: Mucous membranes are moist.   Eyes:      General: No scleral icterus. Right eye: No discharge. Left eye: No discharge. Neck:      Vascular: No JVD. Trachea: No tracheal deviation. Pulmonary:      Effort: Pulmonary effort is normal. No respiratory distress. Abdominal:      General: There is no distension. Tenderness: There is no abdominal tenderness. There is no right CVA tenderness or left CVA tenderness. Musculoskeletal:         General: Normal range of motion. Neurological:      Mental Status: He is alert and oriented to person, place, and time. Mental status is at baseline. Psychiatric:         Mood and Affect: Mood normal.         Behavior: Behavior normal.         Thought Content: Thought content normal.         POC  No results found for this visit on 04/20/22.       Patients recent PSA values are as follows  Lab Results   Component Value Date    PSA 4.83 03/25/2022    PSA 4.15 12/13/2021    PSA 4.19 09/08/2021        Recent BUN/Creatinine:  Lab Results   Component Value Date    BUN 20 03/25/2022    CREATININE 0.8 03/25/2022         Assessment:   Elevated PSA  BPH    Plan:     LUTs stable. Continue tamsulosin. PSA continues to raise despite a course of antibiotics. Recommend repeating in 3 months and if still elevated or trending up consider MRI of the prostate. Pt agreeable. PSA in 3 months with appt to review results.

## 2022-08-11 LAB — PROSTATE SPECIFIC ANTIGEN: 5.72 NG/ML

## 2022-08-17 ENCOUNTER — OFFICE VISIT (OUTPATIENT)
Dept: UROLOGY | Age: 62
End: 2022-08-17
Payer: COMMERCIAL

## 2022-08-17 VITALS
DIASTOLIC BLOOD PRESSURE: 78 MMHG | WEIGHT: 236 LBS | SYSTOLIC BLOOD PRESSURE: 138 MMHG | BODY MASS INDEX: 31.97 KG/M2 | HEIGHT: 72 IN

## 2022-08-17 DIAGNOSIS — R97.20 ELEVATED PSA: Primary | ICD-10-CM

## 2022-08-17 LAB
BILIRUBIN, POC: NORMAL
BLOOD URINE, POC: NORMAL
CLARITY, POC: NORMAL
COLOR, POC: NORMAL
GLUCOSE URINE, POC: NORMAL
KETONES, POC: NORMAL
LEUKOCYTE EST, POC: NORMAL
NITRITE, POC: NORMAL
PH, POC: NORMAL
PROTEIN, POC: NORMAL
SPECIFIC GRAVITY, POC: NORMAL
UROBILINOGEN, POC: NORMAL

## 2022-08-17 PROCEDURE — 81003 URINALYSIS AUTO W/O SCOPE: CPT | Performed by: NURSE PRACTITIONER

## 2022-08-17 PROCEDURE — 99214 OFFICE O/P EST MOD 30 MIN: CPT | Performed by: NURSE PRACTITIONER

## 2022-08-17 ASSESSMENT — ENCOUNTER SYMPTOMS
BACK PAIN: 0
VOMITING: 0
ABDOMINAL PAIN: 0
NAUSEA: 0

## 2022-08-17 NOTE — PROGRESS NOTES
[address] [address]  Dept: [phone]  Loc: [phone]    Visit Date: 8/17/2022        HPI:     Millie Zazueta is a 65 y.o. male who presents today for:  Chief Complaint   Patient presents with    Follow-up    Elevated PSA       HPI  Pt seen in follow up for BPH and elevated psa. Chano Jaeger has a hx of a left apex prostate nodule and PIRADS 3 lesion on MRI for which he underwent MRI fusion prostate biopsy on 2/26/21 by Dr. Krause April. PSA 3.64 at the time of biopsy. Pathology was negative for malignancy. Pt also underwent TRUS prostate biopsy 4/2016 for PSA of 2.64 with pathology showing mild chronic prostatitis and negative for malignancy. No family hx of prostate ca. Noted hx of breast ca in mother and sister. Pt has 100 gram prostate and reports LUTS stable on tamsulosin. No irritative voiding symptoms. Had a course of antibiotics earlier this year and psa continues to trend upward. Current Outpatient Medications   Medication Sig Dispense Refill    tamsulosin (FLOMAX) 0.4 MG capsule Take 1 capsule by mouth daily 90 capsule 4    levothyroxine (SYNTHROID) 125 MCG tablet Take 125 mcg by mouth Daily       Probiotic Product (PRO-BIOTIC BLEND PO) Take by mouth      NONFORMULARY       lisinopril (PRINIVIL;ZESTRIL) 20 MG tablet Take 20 mg by mouth daily      pravastatin (PRAVACHOL) 40 MG tablet Take 40 mg by mouth daily      amLODIPine (NORVASC) 10 MG tablet Take 10 mg by mouth daily       No current facility-administered medications for this visit. Past Medical History  Ann Benavidez  has a past medical history of Hypertension and Melanoma ([facility] [address] Past Surgical History  The patient  has a past surgical history that includes Coarctation of aorta repair (1973); Total Thyroidectomy (03/2018); Skin cancer excision; and shoulder surgery (Left, 03/01/2022).     Family History  This patient's family history is not on file. Social History  Evon Pretty  reports that he has never smoked. He has never used smokeless tobacco.      Subjective:      Review of Systems   Constitutional:  Negative for activity change, appetite change, chills, diaphoresis, fatigue, fever and unexpected weight change. Gastrointestinal:  Negative for abdominal pain, nausea and vomiting. Genitourinary:  Negative for decreased urine volume, difficulty urinating, dysuria, flank pain, frequency, hematuria and urgency. Musculoskeletal:  Negative for back pain. Objective:   /78   Ht 6' (1.829 m)   Wt 236 lb (107 kg)   BMI 32.01 kg/m²     Physical Exam  Vitals reviewed. Constitutional:       General: He is not in acute distress. Appearance: Normal appearance. He is well-developed. He is not ill-appearing or diaphoretic. HENT:      Head: Normocephalic and atraumatic. Right Ear: External ear normal.      Left Ear: External ear normal.      Nose: Nose normal.      Mouth/Throat:      Mouth: Mucous membranes are moist.   Eyes:      General: No scleral icterus. Right eye: No discharge. Left eye: No discharge. Neck:      Vascular: No JVD. Trachea: No tracheal deviation. Pulmonary:      Effort: Pulmonary effort is normal. No respiratory distress. Abdominal:      General: There is no distension. Tenderness: There is no abdominal tenderness. There is no right CVA tenderness or left CVA tenderness. Genitourinary:     Comments: Prostate at least moderately enlarged and semifirm with nodule to left apex  Musculoskeletal:         General: Normal range of motion. Skin:     General: Skin is warm and dry. Neurological:      Mental Status: He is alert and oriented to person, place, and time. Mental status is at baseline. Psychiatric:         Mood and Affect: Mood normal.         Behavior: Behavior normal.         Thought Content:  Thought content normal.       POC  No results found for this visit on 08/17/22. Patients recent PSA values are as follows  Lab Results   Component Value Date    PSA 5.72 08/11/2022    PSA 4.83 03/25/2022    PSA 4.15 12/13/2021        Recent BUN/Creatinine:  Lab Results   Component Value Date/Time    BUN 20 03/25/2022 12:00 AM    CREATININE 0.8 03/25/2022 12:00 AM     Assessment:   Elevated PSA  BPH  Abnormal prostate exam  Family hx breast ca  Plan:     Pt's PSA continues to trend upward to high of 5.72. Reports did have ejaculation close to having test performed. Will repeat PSA in 1 month. No ejaculation for 1 week prior. Perform prior to starting fall fieldwork. Send last biopsy samples for Confirm Mdx testing. F/u in office to review results of PSA and Confirm Mdx testing.

## 2022-09-12 LAB — PROSTATE SPECIFIC ANTIGEN: 4.67 NG/ML

## 2022-09-14 ENCOUNTER — TELEPHONE (OUTPATIENT)
Dept: UROLOGY | Age: 62
End: 2022-09-14

## 2022-09-14 DIAGNOSIS — R97.20 ELEVATED PSA: Primary | ICD-10-CM

## 2022-09-14 DIAGNOSIS — N40.1 BENIGN LOCALIZED PROSTATIC HYPERPLASIA WITH LOWER URINARY TRACT SYMPTOMS (LUTS): ICD-10-CM

## 2022-09-14 NOTE — TELEPHONE ENCOUNTER
As pt's PSA is trending down to 4.67 we can reschedule upcoming appt and see him in 3-4 months with repeat PSA.

## 2022-09-14 NOTE — TELEPHONE ENCOUNTER
Patient completed PSA at [facility]. Does he still need to keep the appointment on 09/21/2022? I can leave a voicemail. [id number] will fax over the results.

## 2022-09-14 NOTE — TELEPHONE ENCOUNTER
Received lab, Scanned in [facility], Routed to [address] [place] [address] Rd

## 2022-09-15 NOTE — TELEPHONE ENCOUNTER
Spoke to patient. Appointment changed. He voiced understanding to repeat PSA prior to appointment. Order sent via mail.

## 2022-11-28 LAB — PROSTATE SPECIFIC ANTIGEN: 4.27 NG/ML

## 2022-12-21 ENCOUNTER — OFFICE VISIT (OUTPATIENT)
Dept: UROLOGY | Age: 62
End: 2022-12-21
Payer: COMMERCIAL

## 2022-12-21 VITALS
HEIGHT: 72 IN | DIASTOLIC BLOOD PRESSURE: 70 MMHG | WEIGHT: 234 LBS | BODY MASS INDEX: 31.69 KG/M2 | SYSTOLIC BLOOD PRESSURE: 120 MMHG

## 2022-12-21 DIAGNOSIS — R97.20 ELEVATED PSA: Primary | ICD-10-CM

## 2022-12-21 PROCEDURE — 99214 OFFICE O/P EST MOD 30 MIN: CPT | Performed by: NURSE PRACTITIONER

## 2022-12-21 RX ORDER — TAMSULOSIN HYDROCHLORIDE 0.4 MG/1
0.4 CAPSULE ORAL DAILY
Qty: 90 CAPSULE | Refills: 4 | Status: SHIPPED | OUTPATIENT
Start: 2022-12-21

## 2022-12-21 ASSESSMENT — ENCOUNTER SYMPTOMS
VOMITING: 0
ABDOMINAL PAIN: 0
BACK PAIN: 0
NAUSEA: 0

## 2022-12-21 NOTE — PROGRESS NOTES
[address] [address]  Dept: [phone]  Loc: [phone]    Visit Date: 12/21/2022        HPI:     Warden Lutz is a 65 y.o. male who presents today for:  Chief Complaint   Patient presents with    Elevated PSA     Review PSA and confirm MDx       HPI    Pt seen in follow up for BPH and elevated psa. Nevaeh Vazquez has a hx of a left apex prostate nodule and PIRADS 3 lesion on MRI for which he underwent MRI fusion prostate biopsy on 2/26/21 by Dr. Tamiko Piña. PSA 3.64 at the time of biopsy. Pathology was negative for malignancy. Pt also underwent TRUS prostate biopsy 4/2016 for PSA of 2.64 with pathology showing mild chronic prostatitis and negative for malignancy. No family hx of prostate ca. Noted hx of breast ca in mother and sister. Pt has 100 gram prostate and reports LUTS stable on tamsulosin. No irritative voiding symptoms. Had a course of antibiotics earlier this year and PSA trended up to 5.72. Repeat PSA trendind down to 4.27. Current Outpatient Medications   Medication Sig Dispense Refill    NONFORMULARY Prostate [facility] 2 capsules daily      tamsulosin (FLOMAX) 0.4 MG capsule Take 1 capsule by mouth daily 90 capsule 4    levothyroxine (SYNTHROID) 125 MCG tablet Take 125 mcg by mouth Daily       Probiotic Product (PRO-BIOTIC BLEND PO) Take by mouth      NONFORMULARY       lisinopril (PRINIVIL;ZESTRIL) 20 MG tablet Take 20 mg by mouth daily      pravastatin (PRAVACHOL) 40 MG tablet Take 40 mg by mouth daily      amLODIPine (NORVASC) 10 MG tablet Take 10 mg by mouth daily       No current facility-administered medications for this visit. Past Medical History  Junior Garza  has a past medical history of Hypertension and Melanoma ([facility] [address] Past Surgical History  The patient  has a past surgical history that includes Coarctation of aorta repair (1973);  Total Thyroidectomy (03/2018); Skin cancer excision; and shoulder surgery (Left, 03/01/2022). Family History  This patient's family history is not on file. Social History  Man Peralta  reports that he has never smoked. He has never used smokeless tobacco.      Subjective:      Review of Systems   Constitutional:  Negative for activity change, appetite change, chills, diaphoresis, fatigue, fever and unexpected weight change. Gastrointestinal:  Negative for abdominal pain, nausea and vomiting. Genitourinary:  Negative for decreased urine volume, difficulty urinating, dysuria, flank pain, frequency, hematuria and urgency. Musculoskeletal:  Negative for back pain. Objective:   /70   Ht 6' (1.829 m)   Wt 234 lb (106.1 kg)   BMI 31.74 kg/m²     Physical Exam  Vitals reviewed. Constitutional:       General: He is not in acute distress. Appearance: Normal appearance. He is well-developed. He is not ill-appearing or diaphoretic. HENT:      Head: Normocephalic and atraumatic. Right Ear: External ear normal.      Left Ear: External ear normal.      Nose: Nose normal.      Mouth/Throat:      Mouth: Mucous membranes are moist.   Eyes:      General: No scleral icterus. Right eye: No discharge. Left eye: No discharge. Neck:      Vascular: No JVD. Trachea: No tracheal deviation. Cardiovascular:      Rate and Rhythm: Normal rate and regular rhythm. Pulmonary:      Effort: Pulmonary effort is normal. No respiratory distress. Abdominal:      General: There is no distension. Tenderness: There is no abdominal tenderness. There is no right CVA tenderness or left CVA tenderness. Musculoskeletal:         General: Normal range of motion. Skin:     General: Skin is warm and dry. Neurological:      Mental Status: He is alert and oriented to person, place, and time. Mental status is at baseline.    Psychiatric:         Mood and Affect: Mood normal.         Behavior: Behavior normal. Thought Content: Thought content normal.       POC  No results found for this visit on 12/21/22. Patients recent PSA values are as follows  Lab Results   Component Value Date    PSA 4.67 09/12/2022    PSA 5.72 08/11/2022    PSA 4.83 03/25/2022        Recent BUN/Creatinine:  Lab Results   Component Value Date/Time    BUN 20 03/25/2022 12:00 AM    CREATININE 0.8 03/25/2022 12:00 AM       Assessment:   Elevated PSA  BPH  Abnormal prostate exam  Family hx breast ca    Plan:     JYOTI 8/2022 with moderately enlarged and semifirm prostate with nodule or left apex. Biopsy in 2021 negative. Confirm Mdx negative for DNA methylation. PSA trending downward. Repeat PSA in 3 months with appt to review results. LUTS controlled at this time. Continue flomax 0.4 mg daily--refill sent to pharmacy      F/u in 3 months with PSA a few days prior.

## 2023-02-16 LAB — PROSTATE SPECIFIC ANTIGEN: 4.75 NG/ML

## 2023-03-03 LAB — PROSTATE SPECIFIC ANTIGEN: 4.78 NG/ML

## 2023-03-15 ENCOUNTER — OFFICE VISIT (OUTPATIENT)
Dept: UROLOGY | Age: 63
End: 2023-03-15
Payer: COMMERCIAL

## 2023-03-15 VITALS
WEIGHT: 236.4 LBS | BODY MASS INDEX: 32.02 KG/M2 | HEIGHT: 72 IN | DIASTOLIC BLOOD PRESSURE: 70 MMHG | SYSTOLIC BLOOD PRESSURE: 120 MMHG

## 2023-03-15 DIAGNOSIS — R97.20 ELEVATED PSA: Primary | ICD-10-CM

## 2023-03-15 PROCEDURE — 99214 OFFICE O/P EST MOD 30 MIN: CPT | Performed by: NURSE PRACTITIONER

## 2023-03-15 ASSESSMENT — ENCOUNTER SYMPTOMS
BACK PAIN: 0
VOMITING: 0
NAUSEA: 0
ABDOMINAL PAIN: 0

## 2023-03-15 NOTE — PROGRESS NOTES
[address] [address]  Dept: [phone]  Loc: [phone]    Visit Date: 3/15/2023        HPI:     Kendall Elliott is a 59 y.o. male who presents today for:  Chief Complaint   Patient presents with    Elevated PSA     PSA prior       HPI    Pt seen in follow up for BPH and elevated psa. Earlene Lizama has a hx of a left apex prostate nodule and PIRADS 3 lesion on MRI for which he underwent MRI fusion prostate biopsy on 2/26/21 by Dr. Bobby Haq. PSA 3.64 at the time of biopsy. Pathology was negative for malignancy. Pt also underwent TRUS prostate biopsy 4/2016 for PSA of 2.64 with pathology showing mild chronic prostatitis and negative for malignancy. No family hx of prostate ca. Noted hx of breast ca in mother and sister. Pt has 100 gram prostate and reports LUTS stable on tamsulosin. Wakes a few times a night but no changes and no irritative voiding symptoms. Current Outpatient Medications   Medication Sig Dispense Refill    Glucosamine HCl (GLUCOSAMINE PO) Take by mouth daily      NONFORMULARY Prostate [facility] 2 capsules daily      tamsulosin (FLOMAX) 0.4 MG capsule Take 1 capsule by mouth daily 90 capsule 4    levothyroxine (SYNTHROID) 125 MCG tablet Take 125 mcg by mouth Daily       Probiotic Product (PRO-BIOTIC BLEND PO) Take by mouth      NONFORMULARY       lisinopril (PRINIVIL;ZESTRIL) 20 MG tablet Take 20 mg by mouth daily      pravastatin (PRAVACHOL) 40 MG tablet Take 40 mg by mouth daily      amLODIPine (NORVASC) 10 MG tablet Take 10 mg by mouth daily       No current facility-administered medications for this visit. Past Medical History  Nigel Esquivel  has a past medical history of Hypertension and Melanoma ([facility] [address] Past Surgical History  The patient  has a past surgical history that includes Coarctation of aorta repair (1973); Total Thyroidectomy (03/2018);  Skin cancer excision; shoulder surgery (Left, 03/01/2022); and Skin cancer excision. Family History  This patient's family history is not on file. Social History  Vilma Meadows  reports that he has never smoked. He has never used smokeless tobacco.      Subjective:      Review of Systems   Constitutional:  Negative for activity change, appetite change, chills, diaphoresis, fatigue, fever and unexpected weight change. Gastrointestinal:  Negative for abdominal pain, nausea and vomiting. Genitourinary:  Positive for frequency and urgency. Negative for decreased urine volume, difficulty urinating, dysuria, flank pain and hematuria. Musculoskeletal:  Negative for back pain. Objective:   /70   Ht 6' (1.829 m)   Wt 236 lb 6.4 oz (107.2 kg)   BMI 32.06 kg/m²     Physical Exam  Vitals reviewed. Constitutional:       General: He is not in acute distress. Appearance: Normal appearance. He is well-developed. He is not ill-appearing or diaphoretic. HENT:      Head: Normocephalic and atraumatic. Right Ear: External ear normal.      Left Ear: External ear normal.      Nose: Nose normal.      Mouth/Throat:      Mouth: Mucous membranes are moist.   Eyes:      General: No scleral icterus. Right eye: No discharge. Left eye: No discharge. Neck:      Vascular: No JVD. Trachea: No tracheal deviation. Pulmonary:      Effort: Pulmonary effort is normal. No respiratory distress. Musculoskeletal:         General: Normal range of motion. Neurological:      Mental Status: He is alert and oriented to person, place, and time. Mental status is at baseline. Psychiatric:         Mood and Affect: Mood normal.         Behavior: Behavior normal.         Thought Content: Thought content normal.       POC  No results found for this visit on 03/15/23.       Patients recent PSA values are as follows  Lab Results   Component Value Date    PSA 4.78 03/03/2023    PSA 4.27 11/28/2022    PSA 4.67 09/12/2022        Recent BUN/Creatinine:  Lab Results   Component Value Date/Time    BUN 20 03/25/2022 12:00 AM    CREATININE 0.8 03/25/2022 12:00 AM         Assessment:   Elevated PSA  BPH  Abnormal prostate exam  Family hx breast ca    Plan:     Pt's PSA improved from 5.72 in August 2022 but still elevated at 4.78 and higher than at time of last 2 prostate biopsies. Discussed MRI of the prostate with Lexii Schwartz and he is agreeable with proceeding. We will schedule MRI of the prostate with appt to review results. Continue tamsulosin 0.4 mg daily      MRI prostate with appt to review results.

## 2023-03-23 ENCOUNTER — TELEPHONE (OUTPATIENT)
Dept: UROLOGY | Age: 63
End: 2023-03-23

## 2023-03-23 NOTE — TELEPHONE ENCOUNTER
Patient scheduled for mri prostate w wo cont  at [facility] on march 31 at 6 am arrive at 530.    Orders given to patient

## 2023-03-31 ENCOUNTER — HOSPITAL ENCOUNTER (OUTPATIENT)
Dept: MRI IMAGING | Age: 63
Discharge: HOME | End: 2023-03-31
Payer: COMMERCIAL

## 2023-03-31 ENCOUNTER — TELEPHONE (OUTPATIENT)
Dept: UROLOGY | Age: 63
End: 2023-03-31

## 2023-03-31 DIAGNOSIS — R97.20 ELEVATED PSA: ICD-10-CM

## 2023-03-31 DIAGNOSIS — F41.9 ANXIETY DUE TO INVASIVE PROCEDURE: Primary | ICD-10-CM

## 2023-03-31 LAB — POC CREATININE WHOLE BLOOD: 0.9 MG/DL (ref 0.5–1.2)

## 2023-03-31 PROCEDURE — 82565 ASSAY OF CREATININE: CPT

## 2023-03-31 RX ORDER — ALPRAZOLAM 0.5 MG/1
0.5 TABLET ORAL ONCE
Qty: 1 TABLET | Refills: 0 | Status: SHIPPED | OUTPATIENT
Start: 2023-03-31 | End: 2023-03-31

## 2023-03-31 NOTE — TELEPHONE ENCOUNTER
Patient is willing to try the medication and aware he will need a .  He will call [facility] himself to schedule MRI

## 2023-03-31 NOTE — TELEPHONE ENCOUNTER
Patient was unable to complete the MRI. He became claustrophobic. Do you want to order something to help him relax and have him reschedule it?

## 2023-06-30 ENCOUNTER — HOSPITAL ENCOUNTER (OUTPATIENT)
Dept: MRI IMAGING | Age: 63
Discharge: HOME | End: 2023-06-30
Payer: COMMERCIAL

## 2023-06-30 LAB — POC CREATININE WHOLE BLOOD: 0.9 MG/DL (ref 0.5–1.2)

## 2023-06-30 PROCEDURE — 82565 ASSAY OF CREATININE: CPT

## 2023-06-30 PROCEDURE — 6360000004 HC RX CONTRAST MEDICATION: Performed by: NURSE PRACTITIONER

## 2023-06-30 PROCEDURE — A9579 GAD-BASE MR CONTRAST NOS,1ML: HCPCS | Performed by: NURSE PRACTITIONER

## 2023-06-30 PROCEDURE — 76377 3D RENDER W/INTRP POSTPROCES: CPT

## 2023-06-30 RX ADMIN — GADOTERIDOL 20 ML: 279.3 INJECTION, SOLUTION INTRAVENOUS at 11:26

## 2023-07-07 ENCOUNTER — SCHEDULED TELEPHONE ENCOUNTER (OUTPATIENT)
Dept: UROLOGY | Age: 63
End: 2023-07-07
Payer: COMMERCIAL

## 2023-07-07 DIAGNOSIS — R97.20 ELEVATED PSA: Primary | ICD-10-CM

## 2023-07-07 PROCEDURE — 99442 PR PHYS/QHP TELEPHONE EVALUATION 11-20 MIN: CPT | Performed by: NURSE PRACTITIONER

## 2023-07-07 RX ORDER — CIPROFLOXACIN 500 MG/1
500 TABLET, FILM COATED ORAL 2 TIMES DAILY
Qty: 6 TABLET | Refills: 0 | Status: SHIPPED | OUTPATIENT
Start: 2023-07-07 | End: 2023-07-10

## 2023-07-07 NOTE — PROGRESS NOTES
Bayron Pastor is a 59 y.o. male evaluated via telephone on 7/7/2023 for Results (CT results and MRI)  . Documentation:  I communicated with the patient and/or health care decision maker about MRI of the prostate results. Details of this discussion including any medical advice provided:       Maude Multani has a hx of a left apex prostate nodule and PIRADS 3 lesion on MRI for which he underwent MRI fusion prostate biopsy on 2/26/21 by Dr. Diana Lynn. PSA 3.64 at the time of biopsy. Pathology was negative for malignancy. Pt also underwent TRUS prostate biopsy 4/2016 for PSA of 2.64 with pathology showing mild chronic prostatitis and negative for malignancy. No family hx of prostate ca. Noted hx of breast ca in mother and sister. Personal hx of malignant melanoma x 2. Pt has 100 gram prostate and reports LUTS stable on tamsulosin. Wakes a few times a night but no changes and no irritative voiding symptoms. Pt's PSA improved from 5.72 in August 2022 but still elevated at 4.78 and higher than at time of last 2 prostate biopsies    MRI of the prostate completed 6/30/23 showing a 103.81 ml prostate but also showing 2 new PIRADS 3 lesions and the prior PIRADS 3 lesion seen on MRI 2021 is now reading as a PIRADS 2. Given pt's elevation in psa, new PIRADS 3 findings, and personal and family hx of cancer recommend proceeding with MRI fusion prostate biopsy at this time. Pt is agreeable. We will schedule Joanne Pérez for a prostate biopsy in the office by Dr. Diana Lynn in the next few weeks. Total Time: minutes: 11-20 minutes    Bayron Pastor was evaluated through a synchronous (real-time) audio encounter. Patient identification was verified at the start of the visit. He (or guardian if applicable) is aware that this is a billable service, which includes applicable co-pays. This visit was conducted with the patient's (and/or legal guardian's) verbal consent.  He has not had a related appointment within

## 2023-07-10 ENCOUNTER — TELEPHONE (OUTPATIENT)
Dept: UROLOGY | Age: 63
End: 2023-07-10

## 2023-07-10 NOTE — TELEPHONE ENCOUNTER
MRI FUSION GUIDED PROSTATE ULTRASOUND/BIOPSY WITH DR. Anjel Hemphill Child      1960    You are scheduled for the above procedure on:    7/25/2023   at:    9:30AM  Your follow up appointment for your biopsy results is on:    8/8/2023     At:   3:30PM    Please note that you will be responsible for any charges that are not paid by your insurance. The prostate biopsy specimens are sent to a Lab and their charges are billed to you separate from the office charges. DESCRIPTION OF PROSTATIC ULTRASOUND AND BIOPSY  Ultrasound uses harmless sound waves to give us pictures of the prostate, and allows us to accurately guide a biopsy needle to areas of concern. The procedure is done in the office. Initially, a complete prostate exam is done. Next the ultrasound probe, finger-like in size and shape, is placed into the rectum. With slight movement of the probe, many different views are obtained. A prostate block may or may not be given at this time. A spring loaded fine needle is place through the probe and directed into the prostate. Six or more biopsies are usually taken. These biopsies are not usually painful. The entire exam takes 20-30 minutes. POSSIBLE RISKS OF THE PROCEDURE  Blood may be noted in the stool and urine for a few days. Blood may be seen in the semen for up to a month. Infection of the prostate or in the urine can occur even with antibiotic preparation. You should call if you develop fever, chills, severe pain, or have continuous or significant bleeding. If you have known hemorrhoids, you may have more bleeding and discomfort in the rectal area following the biopsy. This may last for 3-5 days afterwards. If any concerns arise, please call the office. PREPARATION** PLEASE EAT A REGULAR LUNCH OR BREAKFAST**      1.  DO NOT TAKE: ASPIRIN, MOTRIN,  IBUPROFEN, MOBIC/ MELOXICAM, COUMADIN( WARFARIN) FISH OIL, AND VITAMIN E FOR 5 DAYS BEFORE THE BIOPSY AND 3 DAYS AFTER THE BIOPSY.

## 2023-07-25 ENCOUNTER — PROCEDURE VISIT (OUTPATIENT)
Dept: UROLOGY | Age: 63
End: 2023-07-25

## 2023-07-25 VITALS — RESPIRATION RATE: 18 BRPM | HEIGHT: 73 IN | WEIGHT: 238 LBS | BODY MASS INDEX: 31.54 KG/M2

## 2023-07-25 DIAGNOSIS — R97.20 ELEVATED PSA: Primary | ICD-10-CM

## 2023-07-25 RX ORDER — TOBRAMYCIN 40 MG/ML
80 INJECTION INTRAMUSCULAR; INTRAVENOUS ONCE
Status: COMPLETED | OUTPATIENT
Start: 2023-07-25 | End: 2023-07-25

## 2023-07-25 RX ADMIN — TOBRAMYCIN 80 MG: 40 INJECTION INTRAMUSCULAR; INTRAVENOUS at 09:33

## 2023-07-25 NOTE — PROGRESS NOTES
Mr. Adry Ignacio was seen in follow up for his prostate biopsy. The biopsy is being done with MRI / Ultrasound fusion using the UroNav system. The biopsy was done without difficulty or complication. TRUS prostate biopsy note:  After obtaining informed consent, the rectal ultrasound probe was passed per rectum and the prostate visualized. A local block was performed by instilling 2% lidocaine at the base. Measurements were taken for a total volume of 103 cc. At this point, prostate biopsy was performed. Using [employer], the ultrasound and MRI images were fused and then biopsies of the lesions identified by the radiologist were taken. Three cores were taken from each of the 3 lesions seen on MRI. The rectal probe was removed and there was minimal bleeding. Mr. Adry Ignacio tolerated the procedure well and there were no complications. Prostate size: 103 cc  Cores taken:  6  in addition to    3 cores each from 3 lesions    15 total cores  Complications: none      Assessment:      Prostate biopsy performed without difficulty. This was done with UroNav MRI fused guidance. Plan:        I will see Ardena Fleischer back to discuss the pathology in 1-2 weeks. Further recommendations will be based on these results.

## 2023-07-25 NOTE — PROGRESS NOTES
Procedure: Trus/Biopsy  Pt name and birth date verified Yes  Patient agrees Dr. Tin Corbett is taking biopsies of the prostate Yes  Patient took Enema 2 hours prior to procedure Yes  Patient took 2 pill(s) of cipro day before procedure, day of, and will the day after Yes  Has patient eaten today? Yes  Patient stopped all blood thinners prior to surgery Yes     Patient has given me verbal consent to perform Tobramycin Injection  Yes    Following Dr. Jeaneth Bradley of care. Tobramycin 80MG/2ML GIVEN I.M right UOQ HIP  Lot Number: [id number]  Expiration Date: 12/31/2024  [facility] #: [id number]    Tobramycin supplied by office.

## 2023-08-08 ENCOUNTER — OFFICE VISIT (OUTPATIENT)
Dept: UROLOGY | Age: 63
End: 2023-08-08
Payer: COMMERCIAL

## 2023-08-08 VITALS — BODY MASS INDEX: 32.34 KG/M2 | HEIGHT: 73 IN | WEIGHT: 244 LBS | RESPIRATION RATE: 16 BRPM

## 2023-08-08 DIAGNOSIS — N40.1 BENIGN LOCALIZED PROSTATIC HYPERPLASIA WITH LOWER URINARY TRACT SYMPTOMS (LUTS): ICD-10-CM

## 2023-08-08 DIAGNOSIS — R97.20 ELEVATED PSA: Primary | ICD-10-CM

## 2023-08-08 PROCEDURE — 99214 OFFICE O/P EST MOD 30 MIN: CPT | Performed by: UROLOGY

## 2023-08-08 NOTE — PROGRESS NOTES
TY Granados MD        [address]  [address] [address]  Dept: [phone]  Dept Fax: [phone]: [address] [facility] Urology Office Note -     Patient:  Gina Terrell  YOB: 1960    The patient is a 59 y.o. male who presents today for evaluation of the following problems:   Chief Complaint   Patient presents with    Elevated PSA    Results     BX results        HISTORY OF PRESENT ILLNESS:     Prostate Nodule/Elevated PSA  Here for prostate bx results    FINAL DIAGNOSIS:   A-C. Prostate, right apex, mid, and base, biopsies:    Negative for malignancy. D-F. Prostate, left apex, mid, and base, biopsies:    Negative for malignancy. G. Prostate, lesion #1, biopsy:    Negative for malignancy. BPH  100g prostate  Not on any meds. Some bother        Summary of Previous Records:    Pt has a hx of prostate nodule and underwent TRUS prostate biopsy 4/2016 at which time PSA was 2.64 with pathology showing mild chronic prostatitis and was negative for malignancy. No family hx of prostate ca. Noted hx of breast ca in mother and sister. Pt is not currently on any medications for BPH. Denies irritative voiding symptoms. UA neg. Requested/reviewed records from [facility] office and/or outside physician/EMR    (Patient's old records have been requested, reviewed and pertinent findings summarized in today's note.)    Procedures Today: N/A    Last several PSA's:  Lab Results   Component Value Date    PSA 4.78 03/03/2023    PSA 4.75 02/16/2023    PSA 4.27 11/28/2022       Last total testosterone:  No results found for: TESTOSTERONE    Urinalysis today:  No results found for this visit on 08/08/23.     Last BUN and creatinine:  Lab Results   Component Value Date    BUN 20 03/25/2022     Lab Results   Component Value Date    CREATININE 0.8 03/25/2022       Imaging Reviewed during

## 2024-01-23 LAB — PROSTATE SPECIFIC ANTIGEN: 6.37 NG/ML

## 2024-02-01 ENCOUNTER — OFFICE VISIT (OUTPATIENT)
Dept: UROLOGY | Age: 64
End: 2024-02-01
Payer: COMMERCIAL

## 2024-02-01 VITALS
SYSTOLIC BLOOD PRESSURE: 122 MMHG | WEIGHT: 238.6 LBS | BODY MASS INDEX: 31.62 KG/M2 | DIASTOLIC BLOOD PRESSURE: 68 MMHG | HEIGHT: 73 IN

## 2024-02-01 DIAGNOSIS — R97.20 ELEVATED PSA: Primary | ICD-10-CM

## 2024-02-01 PROCEDURE — 99214 OFFICE O/P EST MOD 30 MIN: CPT | Performed by: NURSE PRACTITIONER

## 2024-02-01 ASSESSMENT — ENCOUNTER SYMPTOMS
NAUSEA: 0
ABDOMINAL PAIN: 0
VOMITING: 0
BACK PAIN: 0

## 2024-02-01 NOTE — PROGRESS NOTES
[facility] [place] [facility]  [facility] UROLOGY  [address]  [facility] [address]  Dept: [phone]  Loc: [phone]    Visit Date: 2/1/2024        HPI:     Jerry Allison is a 64 y.o. male who presents today for:  Chief Complaint   Patient presents with    Elevated PSA     PSA prior       HPI  Pt seen in follow up for elevated psa and bph.     Aden has a hx of a left apex prostate nodule and PIRADS 3 lesion on MRI for which he underwent MRI fusion prostate biopsy on 2/26/21 by Dr. Rainey.  PSA 3.64 at the time of biopsy.  Pathology was negative for malignancy.  Pt also underwent TRUS prostate biopsy 4/2016 for PSA of 2.64 with pathology showing mild chronic prostatitis and negative for malignancy.      No family hx of prostate ca.  Noted hx of breast ca in mother and sister.  Personal hx of malignant melanoma x 2.     Recently PSA trended up to 5.72 and MRI noted new PIRADS 3 lesion and 103 ml prostate.  He underwent MRI fusion prostate biopsy 7/25/23 by Dr. Rainey that was negative for malignancy.     Started on Tamsulosin 0.4 mg daily 8/2023 for BPH and LUTs.  Reports LUTS stable     Current Outpatient Medications   Medication Sig Dispense Refill    Glucosamine HCl (GLUCOSAMINE PO) Take by mouth daily      NONFORMULARY Prostate [place] Support Health 2 capsules daily      tamsulosin (FLOMAX) 0.4 MG capsule Take 1 capsule by mouth daily 90 capsule 4    levothyroxine (SYNTHROID) 125 MCG tablet Take 1 tablet by mouth Daily      Probiotic Product (PRO-BIOTIC BLEND PO) Take by mouth      lisinopril (PRINIVIL;ZESTRIL) 20 MG tablet Take 1 tablet by mouth daily      pravastatin (PRAVACHOL) 40 MG tablet Take 1 tablet by mouth daily      amLODIPine (NORVASC) 10 MG tablet Take 1 tablet by mouth daily      NONFORMULARY  (Patient not taking: Reported on 2/1/2024)       No current facility-administered medications for this visit.       Past Medical History  Jerry  has a past medical

## 2024-07-18 ENCOUNTER — LAB (OUTPATIENT)
Dept: LAB | Age: 64
End: 2024-07-18

## 2024-07-18 LAB
ANION GAP SERPL CALC-SCNC: 13 MEQ/L (ref 8–16)
BASOPHILS ABSOLUTE: 0.1 THOU/MM3 (ref 0–0.1)
BASOPHILS NFR BLD AUTO: 0.8 %
BUN SERPL-MCNC: 19 MG/DL (ref 7–22)
CALCIUM SERPL-MCNC: 9.5 MG/DL (ref 8.5–10.5)
CHLORIDE SERPL-SCNC: 103 MEQ/L (ref 98–111)
CO2 SERPL-SCNC: 25 MEQ/L (ref 23–33)
CREAT SERPL-MCNC: 0.8 MG/DL (ref 0.4–1.2)
DEPRECATED RDW RBC AUTO: 42.6 FL (ref 35–45)
EOSINOPHIL NFR BLD AUTO: 3.7 %
EOSINOPHILS ABSOLUTE: 0.3 THOU/MM3 (ref 0–0.4)
ERYTHROCYTE [DISTWIDTH] IN BLOOD BY AUTOMATED COUNT: 13.2 % (ref 11.5–14.5)
GFR SERPL CREATININE-BSD FRML MDRD: > 90 ML/MIN/1.73M2
GLUCOSE SERPL-MCNC: 127 MG/DL (ref 70–108)
HCT VFR BLD AUTO: 45 % (ref 42–52)
HGB BLD-MCNC: 15 GM/DL (ref 14–18)
IMM GRANULOCYTES # BLD AUTO: 0.02 THOU/MM3 (ref 0–0.07)
IMM GRANULOCYTES NFR BLD AUTO: 0.3 %
LYMPHOCYTES ABSOLUTE: 1.6 THOU/MM3 (ref 1–4.8)
LYMPHOCYTES NFR BLD AUTO: 22.2 %
MCH RBC QN AUTO: 29.4 PG (ref 26–33)
MCHC RBC AUTO-ENTMCNC: 33.3 GM/DL (ref 32.2–35.5)
MCV RBC AUTO: 88.2 FL (ref 80–94)
MONOCYTES ABSOLUTE: 0.7 THOU/MM3 (ref 0.4–1.3)
MONOCYTES NFR BLD AUTO: 10.3 %
NEUTROPHILS ABSOLUTE: 4.5 THOU/MM3 (ref 1.8–7.7)
NEUTROPHILS NFR BLD AUTO: 62.7 %
NRBC BLD AUTO-RTO: 0 /100 WBC
PLATELET # BLD AUTO: 331 THOU/MM3 (ref 130–400)
PMV BLD AUTO: 9.8 FL (ref 9.4–12.4)
POTASSIUM SERPL-SCNC: 4 MEQ/L (ref 3.5–5.2)
RBC # BLD AUTO: 5.1 MILL/MM3 (ref 4.7–6.1)
SODIUM SERPL-SCNC: 141 MEQ/L (ref 135–145)
WBC # BLD AUTO: 7.1 THOU/MM3 (ref 4.8–10.8)

## 2024-08-07 NOTE — PROGRESS NOTES
NPO after midnight  Bring insurance info and drivers license  Wear comfortable clean clothing  Do not bring jewelry  Shower night before and morning of surgery with a liquid antibacterial soap  Bring list of medications with dosage and how often taken  Follow all instructions given by your physician   needed at discharge  You must have a responsible adult with you day of surgery and for 24 hours after surgery  Call [phone] for any questions

## 2024-08-07 NOTE — PROGRESS NOTES
In preparation for their surgical procedure above patient was screened for Obstructive Sleep Apnea (BRIAN) using the STOP-Bang Questionnaire by the Pre-Admission Testing department.  This is a pre-surgical screening tool for patient safety and serves as a recommendation, this WILL NOT cause cancellation of surgery.    STOP-Bang Questionnaire  * Do you currently see a pulmonologist?  No     If yes STOP, do not complete.  Patient follows with Dr.     1.  Do you snore loudly (able to be heard in the next room)?      No    2.  Do you often feel tired or sleepy during the daytime?          No       3.  Has anyone ever told you that you stop breathing during your sleep?       No    4.  Do you have or are you being treated for high blood pressure?          Yes      5.  BMI more than 35?  BMI (Calculated): 31.3        No    6.  Age over 50 years? 64 y.o.      Yes    7.  Neck Circumference greater than 17 inches for male or 16 inches for female?  Measured           (visits only)            Not Applicable    8.  Gender Male?                 Yes      TOTAL SCORE: 3    BRIAN - Low Risk : Yes to 0 - 2 questions  BRIAN - Intermediate Risk : Yes to 3 - 4 questions  BRIAN - High Risk : Yes to 5 - 8 questions    Adapted from:   STOP Questionnaire: A Tool to Screen Patients for Obstructive Sleep Apnea   GUERO Deluna.R.C.P.C., Paulo Ariza M.B.B.S., Amita Odell M.D., Dianna Velazco, Ph.D., MARTIN Herbert.B.B.S., MARTIN Saavedra.Sc., Amelia Crane M.D., Tl Dixon F.R.C.P.C.   Anesthesiology 2008; 108:812-21 Copyright 2008, the American Society of Anesthesiologists, Inc. [employer]   ----------------------------------------------------------------------------------------------------------------

## 2024-08-09 ENCOUNTER — HOSPITAL ENCOUNTER (OUTPATIENT)
Age: 64
Setting detail: OUTPATIENT SURGERY
Discharge: HOME | End: 2024-08-09
Attending: SPECIALIST | Admitting: SPECIALIST
Payer: COMMERCIAL

## 2024-08-09 ENCOUNTER — ANESTHESIA (OUTPATIENT)
Dept: OPERATING ROOM | Age: 64
End: 2024-08-09
Payer: COMMERCIAL

## 2024-08-09 ENCOUNTER — ANESTHESIA EVENT (OUTPATIENT)
Dept: OPERATING ROOM | Age: 64
End: 2024-08-09
Payer: COMMERCIAL

## 2024-08-09 VITALS
DIASTOLIC BLOOD PRESSURE: 63 MMHG | HEART RATE: 50 BPM | OXYGEN SATURATION: 93 % | WEIGHT: 228.4 LBS | HEIGHT: 72 IN | TEMPERATURE: 97.6 F | BODY MASS INDEX: 30.94 KG/M2 | SYSTOLIC BLOOD PRESSURE: 138 MMHG | RESPIRATION RATE: 17 BRPM

## 2024-08-09 PROCEDURE — 2709999900 HC NON-CHARGEABLE SUPPLY: Performed by: SPECIALIST

## 2024-08-09 PROCEDURE — 7100000010 HC PHASE II RECOVERY - FIRST 15 MIN: Performed by: SPECIALIST

## 2024-08-09 PROCEDURE — 7100000011 HC PHASE II RECOVERY - ADDTL 15 MIN: Performed by: SPECIALIST

## 2024-08-09 PROCEDURE — 3600000012 HC SURGERY LEVEL 2 ADDTL 15MIN: Performed by: SPECIALIST

## 2024-08-09 PROCEDURE — 2500000003 HC RX 250 WO HCPCS: Performed by: NURSE ANESTHETIST, CERTIFIED REGISTERED

## 2024-08-09 PROCEDURE — 7100000000 HC PACU RECOVERY - FIRST 15 MIN: Performed by: SPECIALIST

## 2024-08-09 PROCEDURE — 2580000003 HC RX 258: Performed by: SPECIALIST

## 2024-08-09 PROCEDURE — 2500000003 HC RX 250 WO HCPCS: Performed by: SPECIALIST

## 2024-08-09 PROCEDURE — 6360000002 HC RX W HCPCS: Performed by: SPECIALIST

## 2024-08-09 PROCEDURE — 3600000002 HC SURGERY LEVEL 2 BASE: Performed by: SPECIALIST

## 2024-08-09 PROCEDURE — 2580000003 HC RX 258: Performed by: NURSE ANESTHETIST, CERTIFIED REGISTERED

## 2024-08-09 PROCEDURE — 3700000001 HC ADD 15 MINUTES (ANESTHESIA): Performed by: SPECIALIST

## 2024-08-09 PROCEDURE — 7100000001 HC PACU RECOVERY - ADDTL 15 MIN: Performed by: SPECIALIST

## 2024-08-09 PROCEDURE — 3700000000 HC ANESTHESIA ATTENDED CARE: Performed by: SPECIALIST

## 2024-08-09 PROCEDURE — 6360000002 HC RX W HCPCS: Performed by: NURSE ANESTHETIST, CERTIFIED REGISTERED

## 2024-08-09 RX ORDER — FENTANYL CITRATE 50 UG/ML
50 INJECTION, SOLUTION INTRAMUSCULAR; INTRAVENOUS EVERY 5 MIN PRN
Status: DISCONTINUED | OUTPATIENT
Start: 2024-08-09 | End: 2024-08-09 | Stop reason: HOSPADM

## 2024-08-09 RX ORDER — FENTANYL CITRATE 50 UG/ML
25 INJECTION, SOLUTION INTRAMUSCULAR; INTRAVENOUS EVERY 5 MIN PRN
Status: DISCONTINUED | OUTPATIENT
Start: 2024-08-09 | End: 2024-08-09 | Stop reason: HOSPADM

## 2024-08-09 RX ORDER — NALOXONE HYDROCHLORIDE 0.4 MG/ML
INJECTION, SOLUTION INTRAMUSCULAR; INTRAVENOUS; SUBCUTANEOUS PRN
Status: DISCONTINUED | OUTPATIENT
Start: 2024-08-09 | End: 2024-08-09 | Stop reason: HOSPADM

## 2024-08-09 RX ORDER — LIDOCAINE HYDROCHLORIDE AND EPINEPHRINE 10; 10 MG/ML; UG/ML
INJECTION, SOLUTION INFILTRATION; PERINEURAL PRN
Status: DISCONTINUED | OUTPATIENT
Start: 2024-08-09 | End: 2024-08-09 | Stop reason: ALTCHOICE

## 2024-08-09 RX ORDER — LIDOCAINE HYDROCHLORIDE 20 MG/ML
INJECTION, SOLUTION EPIDURAL; INFILTRATION; INTRACAUDAL; PERINEURAL PRN
Status: DISCONTINUED | OUTPATIENT
Start: 2024-08-09 | End: 2024-08-09 | Stop reason: SDUPTHER

## 2024-08-09 RX ORDER — PROPOFOL 10 MG/ML
INJECTION, EMULSION INTRAVENOUS PRN
Status: DISCONTINUED | OUTPATIENT
Start: 2024-08-09 | End: 2024-08-09 | Stop reason: SDUPTHER

## 2024-08-09 RX ORDER — SODIUM CHLORIDE 9 MG/ML
INJECTION, SOLUTION INTRAVENOUS CONTINUOUS PRN
Status: DISCONTINUED | OUTPATIENT
Start: 2024-08-09 | End: 2024-08-09 | Stop reason: SDUPTHER

## 2024-08-09 RX ORDER — ONDANSETRON 2 MG/ML
INJECTION INTRAMUSCULAR; INTRAVENOUS PRN
Status: DISCONTINUED | OUTPATIENT
Start: 2024-08-09 | End: 2024-08-09 | Stop reason: SDUPTHER

## 2024-08-09 RX ORDER — MEPERIDINE HYDROCHLORIDE 25 MG/ML
12.5 INJECTION INTRAMUSCULAR; INTRAVENOUS; SUBCUTANEOUS EVERY 5 MIN PRN
Status: DISCONTINUED | OUTPATIENT
Start: 2024-08-09 | End: 2024-08-09 | Stop reason: HOSPADM

## 2024-08-09 RX ORDER — DIPHENHYDRAMINE HYDROCHLORIDE 50 MG/ML
12.5 INJECTION INTRAMUSCULAR; INTRAVENOUS
Status: DISCONTINUED | OUTPATIENT
Start: 2024-08-09 | End: 2024-08-09 | Stop reason: HOSPADM

## 2024-08-09 RX ORDER — SODIUM CHLORIDE 0.9 % (FLUSH) 0.9 %
5-40 SYRINGE (ML) INJECTION PRN
Status: DISCONTINUED | OUTPATIENT
Start: 2024-08-09 | End: 2024-08-09 | Stop reason: HOSPADM

## 2024-08-09 RX ORDER — MIDAZOLAM HYDROCHLORIDE 1 MG/ML
INJECTION INTRAMUSCULAR; INTRAVENOUS PRN
Status: DISCONTINUED | OUTPATIENT
Start: 2024-08-09 | End: 2024-08-09 | Stop reason: SDUPTHER

## 2024-08-09 RX ORDER — FENTANYL CITRATE 50 UG/ML
INJECTION, SOLUTION INTRAMUSCULAR; INTRAVENOUS PRN
Status: DISCONTINUED | OUTPATIENT
Start: 2024-08-09 | End: 2024-08-09 | Stop reason: SDUPTHER

## 2024-08-09 RX ORDER — SODIUM CHLORIDE 9 MG/ML
INJECTION, SOLUTION INTRAVENOUS PRN
Status: DISCONTINUED | OUTPATIENT
Start: 2024-08-09 | End: 2024-08-09 | Stop reason: HOSPADM

## 2024-08-09 RX ORDER — SODIUM CHLORIDE 0.9 % (FLUSH) 0.9 %
5-40 SYRINGE (ML) INJECTION EVERY 12 HOURS SCHEDULED
Status: DISCONTINUED | OUTPATIENT
Start: 2024-08-09 | End: 2024-08-09 | Stop reason: HOSPADM

## 2024-08-09 RX ADMIN — LIDOCAINE HYDROCHLORIDE 100 MG: 20 INJECTION, SOLUTION EPIDURAL; INFILTRATION; INTRACAUDAL; PERINEURAL at 09:36

## 2024-08-09 RX ADMIN — SODIUM CHLORIDE: 9 INJECTION, SOLUTION INTRAVENOUS at 09:28

## 2024-08-09 RX ADMIN — FENTANYL CITRATE 25 MCG: 50 INJECTION, SOLUTION INTRAMUSCULAR; INTRAVENOUS at 09:45

## 2024-08-09 RX ADMIN — MIDAZOLAM 2 MG: 1 INJECTION INTRAMUSCULAR; INTRAVENOUS at 09:29

## 2024-08-09 RX ADMIN — PROPOFOL 200 MG: 10 INJECTION, EMULSION INTRAVENOUS at 09:36

## 2024-08-09 RX ADMIN — FENTANYL CITRATE 50 MCG: 50 INJECTION, SOLUTION INTRAMUSCULAR; INTRAVENOUS at 09:42

## 2024-08-09 RX ADMIN — WATER 2000 MG: 1 INJECTION INTRAMUSCULAR; INTRAVENOUS; SUBCUTANEOUS at 09:29

## 2024-08-09 RX ADMIN — ONDANSETRON 4 MG: 2 INJECTION INTRAMUSCULAR; INTRAVENOUS at 09:46

## 2024-08-09 ASSESSMENT — PAIN - FUNCTIONAL ASSESSMENT: PAIN_FUNCTIONAL_ASSESSMENT: 0-10

## 2024-08-09 ASSESSMENT — LIFESTYLE VARIABLES: SMOKING_STATUS: 0

## 2024-08-09 NOTE — DISCHARGE INSTRUCTIONS
POST OPERATIVE INSTRUCTION SHEET  SKIN TUMOR/LESION REMOVAL          Activity:    No strenuous activity for 48 hours  No activity that stresses the suture closure/incision  Regular diet  ABSOLUTELY NO NICOTINE OF ANY TYPE    Wound Care:  Dressing to left arm and left upper chest to stay on until seen in office. Keep clean, dry and intact.  Keep all incisions clean    Limitations:  No swimming, hot tub, sauna or soaking in a bathtub    Prescriptions:  Take exactly as prescribed  Complete antibiotic as prescribed, next dose tonight with supper  Tylenol for pain as needed    Follow-Up:  Thursday August 15, 2024 @ 2:15 pm      Notify our office if you experience any of the following:   Develop a fever (temperature is greater than 100.5F)   Develop redness greater than 1 cm around incision or red streaks up extremity   Have any excess bleeding/ increased drainage or swelling at the incision site    *Note:  Your pathology results will be reviewed with you at your scheduled follow-up appointment.

## 2024-08-09 NOTE — PROGRESS NOTES
1028: Patient arrived to Phase I recovery via cart. Eyes closed with spontaneous respirations even and unlabored. Oral airway intact. Report received from ERICK Bustamante and TRENT Singh.  1030: VSS. Patient awakens to voice. Oral airway removed. Denies pain and returns to sleep.  1035: VSS.  1040: VSS. Patient continues to deny pain.  1045: VSS. Patient encouraged to deep breathe. Follows commands.  1050: VSS.  1055: VSS.  1058: Pt meets criteria to complete Phase I.  1059: Entered into Phase II Recovery via cart. Awake and alert. Wife at bedside. Snack and drink provided.   1131: IV removed. Discharge instructions reviewed with patient and patient's wife. AVS provided for discharge. Pt sat edge of bed and dressed independently.  1140: Patient escorted to vehicle and discharged home in stable condition with wife.

## 2024-08-09 NOTE — OP NOTE
Operative Note    Patient name: Jerry Allison             Medical Record Number: [id number]    Primary Care Physician: Ashanti Mayorga, APRN - CNP     1960    Date of Procedure: 2024    Pre-operative Diagnosis: 20cm2 defect of left hand s/p MOHS for squamous cell carcinomas    Post-operative Diagnosis: Same    Procedure Performed: Full thickness skin graft (20 cm2) repair of left hand defect    Surgeons/Assistants: Dr. Johann Cooper MD     Estimated Blood Loss: 5ml     Complications: none immediately appreciated    Procedure:    With the patient lying in the supine position and under adequate anesthesia per the anesthesia team.   Local anesthesia consisting of 19 ml of 1% Lidocaine 1:100,000 with epinephrine solution of the donor site of the left infraclavicular area as well as the left hand defect.  The areas were prepped and draped in the standard surgical fashion.  The patient has very thin skin and a large (20cm2) defect which could not be closed primarily, therefore a full thickness skin graft was taken.  It was defatted and secured in position with a 3-0 silk suture tie and then a 4-0 chromic suture was placed in a simple interrupted fashion.  A Bacitracin Xeroform and moist cotton ball tie over bolster was secured using the tails of the silk sutures.  The donor site was closed with a 3-0 Monocryl suture placed in interrupted buried fashion and then skin staples with Bacitracin applied over donor site and bulky sterile dressings were applied over both operative areas.  The patient tolerated the procedure well and remained hemodynamically stable throughout the procedure and was quite comfortable throughout the operative course.    Clinical staging for cancer cases:  Ct  Cn  Cm    Johann Cooper MD  Electronically signed by me on 2024 at 11:02 AMOperative Note      Patient: Jerry Allison  YOB: 1960  MRN: [id number]    Date of Procedure: 2024    Pre-Op Diagnosis

## 2024-08-09 NOTE — ANESTHESIA POSTPROCEDURE EVALUATION
Department of Anesthesiology  Postprocedure Note    Patient: Jerry Allison  MRN: [id number]  YOB: 1960  Date of evaluation: 8/9/2024    Procedure Summary       Date: 08/09/24 Room / Location: [address]    Anesthesia Start: 0928 Anesthesia Stop: 1030    Procedure: MOHS Defect Repair SCC Left Dorsal Hand \"A\" and \"B\" with full thickness skin graft from left upper chest (Face) Diagnosis:       Squamous cell carcinoma of left hand      (Squamous cell carcinoma of left hand [C44.629])    Surgeons: Johann Cooper MD Responsible Provider: Tony Harvey MD    Anesthesia Type: General ASA Status: 2            Anesthesia Type: General    Fran Phase I: Fran Score: 8    Fran Phase II:      Anesthesia Post Evaluation    Patient location during evaluation: PACU  Patient participation: complete - patient participated  Level of consciousness: awake  Nausea & Vomiting: no nausea  Cardiovascular status: hemodynamically stable  Respiratory status: spontaneous ventilation  Pain management: adequate    No notable events documented.

## 2024-08-09 NOTE — ANESTHESIA PRE PROCEDURE
back, nose and back of head   • SKIN CANCER EXCISION      2/2023 on face   • TOTAL THYROIDECTOMY  03/2018       Social History:    Social History     Tobacco Use   • Smoking status: Never   • Smokeless tobacco: Never   Substance Use Topics   • Alcohol use: Yes     Comment: occasional                                Counseling given: Not Answered      Vital Signs (Current):   Vitals:    08/07/24 1124 08/09/24 0833   BP:  (!) 161/70   Pulse:  (!) 48   Resp:  16   Temp:  97 °F (36.1 °C)   TempSrc:  Temporal   SpO2:  96%   Weight: 104.3 kg (230 lb) 103.6 kg (228 lb 6.4 oz)   Height: 1.829 m (6') 1.829 m (6')                                              BP Readings from Last 3 Encounters:   08/09/24 (!) 161/70   02/01/24 122/68   03/15/23 120/70       NPO Status: Time of last liquid consumption: 2000                        Time of last solid consumption: 2000                        Date of last liquid consumption: 08/08/24                        Date of last solid food consumption: 08/08/24    BMI:   Wt Readings from Last 3 Encounters:   08/09/24 103.6 kg (228 lb 6.4 oz)   02/01/24 108.2 kg (238 lb 9.6 oz)   08/08/23 110.7 kg (244 lb)     Body mass index is 30.98 kg/m².    CBC:   Lab Results   Component Value Date/Time    WBC 7.1 07/18/2024 09:47 AM    RBC 5.10 07/18/2024 09:47 AM    HGB 15.0 07/18/2024 09:47 AM    HCT 45.0 07/18/2024 09:47 AM    MCV 88.2 07/18/2024 09:47 AM     07/18/2024 09:47 AM       CMP:   Lab Results   Component Value Date/Time     07/18/2024 09:47 AM    K 4.0 07/18/2024 09:47 AM     07/18/2024 09:47 AM    CO2 25 07/18/2024 09:47 AM    BUN 19 07/18/2024 09:47 AM    CREATININE 0.8 07/18/2024 09:47 AM    LABGLOM > 90 07/18/2024 09:47 AM    GLUCOSE 127 07/18/2024 09:47 AM    CALCIUM 9.5 07/18/2024 09:47 AM    BILITOT 0.3 03/25/2022 12:00 AM    ALKPHOS 61 03/25/2022 12:00 AM    AST 21 03/25/2022 12:00 AM    ALT 27 03/25/2022 12:00 AM       POC Tests: No results for input(s): \"POCGLU\",

## 2024-08-09 NOTE — H&P
[facility]  History and Physical Update    Pt Name: Jerry Allison  MRN: [id number]  YOB: 1960  Date of evaluation: 8/9/2024    I have examined the patient and reviewed the H&P/Consult and there are no changes to the patient or plans.      Johann Cooper MD  Electronically signed 8/9/2024 at 9:27 AM

## 2024-08-13 ENCOUNTER — OFFICE VISIT (OUTPATIENT)
Dept: UROLOGY | Age: 64
End: 2024-08-13
Payer: COMMERCIAL

## 2024-08-13 ENCOUNTER — TELEPHONE (OUTPATIENT)
Dept: UROLOGY | Age: 64
End: 2024-08-13

## 2024-08-13 VITALS
DIASTOLIC BLOOD PRESSURE: 60 MMHG | SYSTOLIC BLOOD PRESSURE: 124 MMHG | BODY MASS INDEX: 31.69 KG/M2 | WEIGHT: 234 LBS | HEIGHT: 72 IN

## 2024-08-13 DIAGNOSIS — R97.20 ELEVATED PSA: Primary | ICD-10-CM

## 2024-08-13 PROCEDURE — 99214 OFFICE O/P EST MOD 30 MIN: CPT | Performed by: NURSE PRACTITIONER

## 2024-08-13 RX ORDER — ALPRAZOLAM 0.5 MG/1
0.5 TABLET ORAL ONCE
Qty: 1 TABLET | Refills: 0 | Status: SHIPPED | OUTPATIENT
Start: 2024-08-13 | End: 2024-08-13

## 2024-08-13 RX ORDER — DOXYCYCLINE HYCLATE 100 MG
TABLET ORAL
COMMUNITY
Start: 2024-07-30

## 2024-08-13 ASSESSMENT — ENCOUNTER SYMPTOMS
VOMITING: 0
BACK PAIN: 0
ABDOMINAL PAIN: 0
NAUSEA: 0

## 2024-08-13 NOTE — PROGRESS NOTES
History  Jerry  has a past medical history of Hyperlipidemia, Hypertension, Melanoma (HCC), and Skin cancer.    Past Surgical History  The patient  has a past surgical history that includes Coarctation of aorta repair (1973); Total Thyroidectomy (03/2018); Skin cancer excision; shoulder surgery (Left, 03/01/2022); Skin cancer excision; and Facial Surgery (N/A, 8/9/2024).    Family History  This patient's family history includes Breast Cancer in his mother, sister, and sister; Cancer in his mother, sister, and sister; Diabetes in his maternal grandfather; High Blood Pressure in his father; Other in his father.    Social History  Jerry  reports that he has never smoked. He has never used smokeless tobacco. He reports current alcohol use. He reports that he does not use drugs.      Subjective:      Review of Systems   Constitutional:  Negative for activity change, appetite change, chills, diaphoresis, fatigue, fever and unexpected weight change.   Gastrointestinal:  Negative for abdominal pain, nausea and vomiting.   Genitourinary:  Positive for frequency and urgency. Negative for decreased urine volume, difficulty urinating, dysuria, flank pain and hematuria.   Musculoskeletal:  Negative for back pain.       Objective:   /60   Ht 1.829 m (6' 0.01\")   Wt 106.1 kg (234 lb)   BMI 31.73 kg/m²     Physical Exam  Vitals reviewed.   Constitutional:       General: He is not in acute distress.     Appearance: Normal appearance. He is well-developed. He is not ill-appearing or diaphoretic.   HENT:      Head: Normocephalic and atraumatic.      Right Ear: External ear normal.      Left Ear: External ear normal.      Nose: Nose normal.      Mouth/Throat:      Mouth: Mucous membranes are moist.   Eyes:      General: No scleral icterus.        Right eye: No discharge.         Left eye: No discharge.   Neck:      Vascular: No JVD.      Trachea: No tracheal deviation.   Pulmonary:      Effort: Pulmonary effort is normal.

## 2024-08-13 NOTE — TELEPHONE ENCOUNTER
Patient scheduled for MRI PROSTATE W WO  at [facility] on 9/19/24.  Arrival of 8 AM for a 830 AM scan time.  Order mailed with instructions or given to the patient in the office

## 2024-09-19 ENCOUNTER — HOSPITAL ENCOUNTER (OUTPATIENT)
Dept: MRI IMAGING | Age: 64
Discharge: HOME | End: 2024-09-19
Payer: COMMERCIAL

## 2024-09-19 DIAGNOSIS — R97.20 ELEVATED PSA: ICD-10-CM

## 2024-09-19 PROCEDURE — 76377 3D RENDER W/INTRP POSTPROCES: CPT

## 2024-09-19 PROCEDURE — A9579 GAD-BASE MR CONTRAST NOS,1ML: HCPCS | Performed by: NURSE PRACTITIONER

## 2024-09-19 PROCEDURE — 6360000004 HC RX CONTRAST MEDICATION: Performed by: NURSE PRACTITIONER

## 2024-09-19 RX ADMIN — GADOTERIDOL 20 ML: 279.3 INJECTION, SOLUTION INTRAVENOUS at 09:34

## 2024-09-26 ENCOUNTER — OFFICE VISIT (OUTPATIENT)
Dept: UROLOGY | Age: 64
End: 2024-09-26
Payer: COMMERCIAL

## 2024-09-26 VITALS — HEIGHT: 72 IN | RESPIRATION RATE: 15 BRPM | BODY MASS INDEX: 31.69 KG/M2 | WEIGHT: 234 LBS

## 2024-09-26 DIAGNOSIS — F41.9 ANXIETY DUE TO INVASIVE PROCEDURE: ICD-10-CM

## 2024-09-26 DIAGNOSIS — R97.20 ELEVATED PSA: Primary | ICD-10-CM

## 2024-09-26 DIAGNOSIS — N40.1 BENIGN LOCALIZED PROSTATIC HYPERPLASIA WITH LOWER URINARY TRACT SYMPTOMS (LUTS): ICD-10-CM

## 2024-09-26 PROCEDURE — 99214 OFFICE O/P EST MOD 30 MIN: CPT | Performed by: UROLOGY

## 2025-02-24 LAB — PROSTATE SPECIFIC ANTIGEN: 7.34 NG/ML

## 2025-03-06 ENCOUNTER — OFFICE VISIT (OUTPATIENT)
Dept: UROLOGY | Age: 65
End: 2025-03-06
Payer: COMMERCIAL

## 2025-03-06 VITALS
HEIGHT: 72 IN | DIASTOLIC BLOOD PRESSURE: 68 MMHG | SYSTOLIC BLOOD PRESSURE: 124 MMHG | WEIGHT: 239.6 LBS | BODY MASS INDEX: 32.45 KG/M2

## 2025-03-06 DIAGNOSIS — R97.20 ELEVATED PSA: Primary | ICD-10-CM

## 2025-03-06 PROCEDURE — 99213 OFFICE O/P EST LOW 20 MIN: CPT | Performed by: NURSE PRACTITIONER

## 2025-03-06 ASSESSMENT — ENCOUNTER SYMPTOMS
VOMITING: 0
BACK PAIN: 0
ABDOMINAL PAIN: 0
NAUSEA: 0

## 2025-03-06 NOTE — PROGRESS NOTES
[facility] [place] [facility]  [facility] UROLOGY  [address]  [facility] [address]  Dept: [phone]  Loc: [phone]    Visit Date: 3/6/2025        HPI:     Jerry Allison is a 65 y.o. male who presents today for:  Chief Complaint   Patient presents with    Elevated PSA     PSA prior       HPI    Pt seen in follow up for elevated psa and bph.      Aden has a hx of a left apex prostate nodule and elevated psa.    -S/p MRI fusion prostate biopsy 7/25/23 by Dr. Rainey (PSA 5.72)  -S/p MRI fusion prostate biopsy on 2/26/21 (PSA 3.64) by Dr. Rainey. Pathology was negative for malignancy.    -S/p TRUS prostate biopsy 4/2016 for (PSA 2.64) with pathology showing mild chronic prostatitis and negative for malignancy.      PSA trending up steadily since 2023.  Currently 7.34 as of 2/24/25.  MRI of the prostate 9/2024 with 98 ml prostate and PIRADS 4 lesion.      No family hx of prostate ca.  Noted hx of breast ca in mother and sister.  Personal hx of malignant melanoma x 2.      Started on Tamsulosin 0.4 mg daily 8/2023 for BPH and LUTs.  Reports LUTS stable.  No significant changes.      Current Outpatient Medications   Medication Sig Dispense Refill    Misc Natural Products (PROSTATE SUPPORT PO) Take 2 capsules by mouth daily Prostate [place] Support Health      Glucosamine HCl (GLUCOSAMINE PO) Take by mouth daily      tamsulosin (FLOMAX) 0.4 MG capsule Take 1 capsule by mouth daily 90 capsule 4    levothyroxine (SYNTHROID) 125 MCG tablet Take 1 tablet by mouth Daily      lisinopril (PRINIVIL;ZESTRIL) 20 MG tablet Take 1 tablet by mouth daily      pravastatin (PRAVACHOL) 40 MG tablet Take 1 tablet by mouth daily      amLODIPine (NORVASC) 10 MG tablet Take 1 tablet by mouth daily      doxycycline hyclate (VIBRA-TABS) 100 MG tablet  (Patient not taking: Reported on 3/6/2025)       No current facility-administered medications for this visit.       Past Medical History  Jerry  has a past

## 2025-03-07 ENCOUNTER — TELEPHONE (OUTPATIENT)
Dept: UROLOGY | Age: 65
End: 2025-03-07

## 2025-03-07 NOTE — TELEPHONE ENCOUNTER
Please let pt know psa is 7.79 as of 3/6/25.  Recommend MRI fusion prostate biopsy in the OR with NERY Rainey at .  Please facilitate.

## 2025-03-10 NOTE — TELEPHONE ENCOUNTER
Called patient.  Unaware of a biopsy.  Said he has been waiting for a phone call to go over test results.

## 2025-03-11 ENCOUNTER — TELEPHONE (OUTPATIENT)
Dept: UROLOGY | Age: 65
End: 2025-03-11

## 2025-03-11 RX ORDER — CIPROFLOXACIN 500 MG/1
500 TABLET, FILM COATED ORAL 2 TIMES DAILY
Qty: 6 TABLET | Refills: 0 | Status: SHIPPED | OUTPATIENT
Start: 2025-03-11 | End: 2025-03-14

## 2025-03-11 NOTE — TELEPHONE ENCOUNTER
MESSAGE SENT FROM [facility] TO SCHEDULE PATIENT AT [facility] FOR URONAV BX; PATIENT UNABLE TO DO BX IN APRIL OR MAY DUE TO PLANTING SEASON.  HE SAID HE WANTED TO SCHEDULE IN OFFICE, FIRST AVAILABLE

## 2025-03-11 NOTE — TELEPHONE ENCOUNTER
Patient advised cipro was sent to the pharmacy to start the day prior to the procedure. He voiced understanding.

## 2025-03-11 NOTE — TELEPHONE ENCOUNTER
PROSTATE ULTRASOUND/BIOPSY WITH DR RADHA Allison      1960    You are scheduled for the above procedure on:    3/18/2025   at:    10:00AM  Your follow up appointment for your biopsy results is on:    3/27/2025     At:   2:30PM    Please note that you will be responsible for any charges that are not paid by your insurance.  The prostate biopsy specimens are sent to a Lab and their charges are billed to you separate from the office charges.      DESCRIPTION OF PROSTATIC ULTRASOUND AND BIOPSY  Ultrasound uses harmless sound waves to give us pictures of the prostate, and allows us to accurately guide a biopsy needle to areas of concern.  The procedure is done in the office.  Initially, a complete prostate exam is done.  Next the ultrasound probe, finger-like in size and shape, is placed into the rectum.  With slight movement of the probe, many different views are obtained.  A prostate block may or may not be given at this time.  A spring loaded fine needle is place through the probe and directed into the prostate.  Six or more biopsies are usually taken.  These biopsies are not usually painful.  The entire exam takes 20-30 minutes.    POSSIBLE RISKS OF THE PROCEDURE  Blood may be noted in the stool and urine for a few days.  Blood may be seen in the semen for up to a month.  Infection of the prostate or in the urine can occur even with antibiotic preparation.  You should call if you develop fever, chills, severe pain, or have continuous or significant bleeding.    If you have known hemorrhoids, you may have more bleeding and discomfort in the rectal area following the biopsy.  This may last for 3-5 days afterwards.  If any concerns arise, please call the office.    PREPARATION** PLEASE EAT A REGULAR LUNCH OR BREAKFAST**    1.  DO NOT TAKE: ASPIRIN, MOTRIN,  IBUPROFEN, MOBIC/ MELOXICAM, COUMADIN( WARFARIN) FISH OIL, GARLIC AND VITAMIN E FOR 5 DAYS BEFORE THE BIOPSY AND 3 DAYS AFTER THE BIOPSY.  YOU MAY

## 2025-03-18 ENCOUNTER — PROCEDURE VISIT (OUTPATIENT)
Dept: UROLOGY | Age: 65
End: 2025-03-18

## 2025-03-18 VITALS
DIASTOLIC BLOOD PRESSURE: 80 MMHG | WEIGHT: 230 LBS | HEIGHT: 72 IN | OXYGEN SATURATION: 99 % | HEART RATE: 59 BPM | BODY MASS INDEX: 31.15 KG/M2 | SYSTOLIC BLOOD PRESSURE: 122 MMHG | RESPIRATION RATE: 20 BRPM

## 2025-03-18 DIAGNOSIS — R97.20 ELEVATED PSA: Primary | ICD-10-CM

## 2025-03-18 RX ORDER — TOBRAMYCIN 40 MG/ML
80 INJECTION INTRAMUSCULAR; INTRAVENOUS ONCE
Status: COMPLETED | OUTPATIENT
Start: 2025-03-18 | End: 2025-03-18

## 2025-03-18 RX ADMIN — TOBRAMYCIN 80 MG: 40 INJECTION INTRAMUSCULAR; INTRAVENOUS at 10:51

## 2025-03-18 NOTE — PROGRESS NOTES
Mr. Allison was seen in follow up for his prostate biopsy. The biopsy is being done with MRI / Ultrasound fusion using the UroNav system.  The biopsy was done without difficulty or complication.    TRUS prostate biopsy note:  After obtaining informed consent, the rectal ultrasound probe was passed per rectum and the prostate visualized.  A local block was performed by instilling 2% lidocaine at the base.  Measurements were taken for a total volume of 100 cc.    At this point, prostate biopsy was performed.  Using UroNav, the ultrasound and MRI images were fused and then biopsies of the lesions identified by the radiologist were taken.  Three cores were taken from each of the 1 lesions seen on MRI.  The rectal probe was removed and there was minimal bleeding.  Mr. Allison tolerated the procedure well and there were no complications.    Prostate size: 100 cc  Cores taken:   12 in addition to    3 cores each from 1 lesions    15 total cores  Complications: none      Assessment:      Prostate biopsy performed without difficulty.  This was done with UroNav MRI fused guidance.        Plan:        I will see Jerry augustin to discuss the pathology in 1-2 weeks.  Further recommendations will be based on these results.

## 2025-03-18 NOTE — PROGRESS NOTES
Procedure: Trus/Biopsy  Pt name and birth date verified Yes  Patient agrees Dr Rainey  is taking biopsies of the prostate Yes  Patient took Enema 2 hours prior to procedure Yes  Patient took 2 pill(s) of cipro day before procedure, day of, and will the day after Yes  Has patient eaten today?  Yes  Patient stopped all blood thinners prior to surgery Yes    DIAGNOSIS/INDICATION: Elevated PSA     Patient has given me verbal consent to perform Tobramycin Injection  Yes    Following Dr Rainey  plan of care.  Tobramycin 80MG/2ML GIVEN I.M right UOQ HIP  Lot Number: [id number]  Expiration Date: 4/27/27  NDC #: 29871-374-94    Medication supplied by office.

## 2025-03-27 ENCOUNTER — OFFICE VISIT (OUTPATIENT)
Dept: UROLOGY | Age: 65
End: 2025-03-27
Payer: COMMERCIAL

## 2025-03-27 ENCOUNTER — TELEPHONE (OUTPATIENT)
Dept: UROLOGY | Age: 65
End: 2025-03-27

## 2025-03-27 VITALS — HEIGHT: 72 IN | WEIGHT: 230 LBS | BODY MASS INDEX: 31.15 KG/M2 | RESPIRATION RATE: 10 BRPM

## 2025-03-27 DIAGNOSIS — C61 PROSTATE CANCER (HCC): Primary | ICD-10-CM

## 2025-03-27 PROCEDURE — 99214 OFFICE O/P EST MOD 30 MIN: CPT | Performed by: NURSE PRACTITIONER

## 2025-03-27 ASSESSMENT — ENCOUNTER SYMPTOMS
NAUSEA: 0
ABDOMINAL PAIN: 0
VOMITING: 0
BACK PAIN: 0

## 2025-03-27 NOTE — PROGRESS NOTES
[facility] [place] [facility]  [facility]  [address]  [address]  [facility] [address]  Dept: [phone]  Loc: [phone]    Visit Date: 3/27/2025        HPI:     Jerry Allison is a 65 y.o. male who presents today for:  Chief Complaint   Patient presents with    Results       HPI  Pt seen in follow up after recent prostate biopsy.      Aden has a hx of a left apex prostate nodule and elevated psa.    -S/p MRI fusion prostate biopsy 7/25/23 by Dr. Rainey (PSA 5.72)  -S/p MRI fusion prostate biopsy on 2/26/21 (PSA 3.64) by Dr. Rainey. Pathology was negative for malignancy.    -S/p TRUS prostate biopsy 4/2016 for (PSA 2.64) with pathology showing mild chronic prostatitis and negative for malignancy.  -S/p MRI fusion prostate biopsy 3/2025 by Dr. Rainey (PSA 7.79) with findings of GS 3+4=7 GG2 from RM in 1/2 cores with cribriform pattern present and GS 3+4=7 GG2 in 4/4 cores from prostate lesion.      No family hx of prostate ca.  Noted hx of breast ca in mother and sister.  Personal hx of malignant melanoma x 2.      Started on Tamsulosin 0.4 mg daily 8/2023 for BPH and LUTs. Reports LUTS stable. No significant changes.       Current Outpatient Medications   Medication Sig Dispense Refill    doxycycline hyclate (VIBRA-TABS) 100 MG tablet       Misc Natural Products (PROSTATE SUPPORT PO) Take 2 capsules by mouth daily Prostate [facility] Support [facility]      Glucosamine HCl (GLUCOSAMINE PO) Take by mouth daily      tamsulosin (FLOMAX) 0.4 MG capsule Take 1 capsule by mouth daily 90 capsule 4    levothyroxine (SYNTHROID) 125 MCG tablet Take 1 tablet by mouth Daily      lisinopril (PRINIVIL;ZESTRIL) 20 MG tablet Take 1 tablet by mouth daily      pravastatin (PRAVACHOL) 40 MG tablet Take 1 tablet by mouth daily      amLODIPine (NORVASC) 10 MG tablet Take 1 tablet by mouth daily       No current facility-administered medications for this visit.       Past Medical History  Jerry  has a past

## 2025-03-28 ENCOUNTER — TELEPHONE (OUTPATIENT)
Dept: UROLOGY | Age: 65
End: 2025-03-28

## 2025-04-01 ENCOUNTER — SOCIAL WORK (OUTPATIENT)
Dept: INFUSION THERAPY | Age: 65
End: 2025-04-01

## 2025-04-01 NOTE — PROGRESS NOTES
Oncology Social Work    Date: 4/1/2025  Time: 11:15 AM  Name: Jerry Allison  MRN: [id number]     Contact Type: Distress Tool Follow-up    Note:   Situation: This staff called Jerry Allison (goes by Aden) via phone support to introduce myself as the Oncology Social Worker.     Background: Aden was referred to this staff by the Urology Dept after a recent appointment here. This staff was calling to review the Distress Thermometer provided during their visit.    Coping Score 1    Areas of Concern Identified    Physical Concern: None selected    Expressive Concern: None selected    Social Concern: None selected    Practical Concern: None selected    Existential Concern: None selected    Assessment: This staff had the opportunity to speak with Aden. He seemed pleasant as we discussed the call's purpose was to educate about the services provided by the SW. He had no outstanding concerns and shared that he is coping as bet he can at this time.He explained he has been dealing with skin cancer for several years and although this is quite different, he still has a lot of the same feelings.   - The opportunity to complete an Advance Directive was offered since it is not available in Medical Records. He thinks his  has a copy. I explained the need for having the document on file. He agreed to try and locate and if he can't find it, he will reach out for assistance.   - No community referrals were placed now because he is too early to know or understand what services he may need. Therefore, his referral services may be reconsidered should his needs regarding assistance change.    Recommendation: Follow-up will be initiated based on need.  provided my contact information and will remain available for support.        RENATO Wright, CONSTANCE, FRED  Oncology Social Worker      Electronically signed by RENATO Wright LSW, ACHP-SW on 4/1/2025 at 11:15 AM

## 2025-04-11 ENCOUNTER — HOSPITAL ENCOUNTER (OUTPATIENT)
Dept: PET IMAGING | Age: 65
Discharge: HOME | End: 2025-04-11
Payer: COMMERCIAL

## 2025-04-11 ENCOUNTER — RESULTS FOLLOW-UP (OUTPATIENT)
Dept: UROLOGY | Age: 65
End: 2025-04-11

## 2025-04-11 DIAGNOSIS — C61 PROSTATE CANCER (HCC): ICD-10-CM

## 2025-04-11 PROCEDURE — 78815 PET IMAGE W/CT SKULL-THIGH: CPT

## 2025-05-09 ENCOUNTER — TELEPHONE (OUTPATIENT)
Dept: UROLOGY | Age: 65
End: 2025-05-09

## 2025-05-09 DIAGNOSIS — C61 PROSTATE CANCER (HCC): Primary | ICD-10-CM

## 2025-05-09 DIAGNOSIS — Z01.818 PRE-OP TESTING: ICD-10-CM

## 2025-05-09 NOTE — TELEPHONE ENCOUNTER
[facility]  [address], [address]  [place] [address] [phone]    START BOWEL PREP ON 6/11/25    Surgery Bowel Preparation    Purchase a 4.1 oz bottle of Miralax at your pharmacy which will be in powder form; mix with 64 ounces of water or Gatorade (NOT RED). Follow the instructions as directed for mixing with water and drink the entire bottle, Take 4 Dulcolax tablets the afternoon ( take at the time you start drinking at 2:  pm.) before your scheduled surgery.  Start drinking approximately 2:00pm.       Start a clear liquid diet (for dinner) the evening before surgery.  You may have the following:   Water   Apple, grape or cranberry juice (CLEAR ONLY)   Clear, fat free broth   Clear sodas (7-up, Sprite)   Plain gelatin (Jell-o) any flavor except- RED   Popsicles without bits of fruit or fruit pulp (NOT RED)   Tea or coffee without milk or cream    Nothing to eat or drink after midnight before your scheduled surgery.    Do a fleets enema the night prior to your surgery between 7:00pm and 8:00pm    Please contact our office at [phone] if you have any questions.

## 2025-05-09 NOTE — TELEPHONE ENCOUNTER
MEDICAL CLEARANCE FORM    IF THE PATIENT NEEDS A CLEARANCE APPOINTMENT WITH YOUR OFFICE PLEASE CONTACT THE PATIENT    Clearance From  Ashanti Mayorga     Appointment Date    Time       Jerry Allison  1960  Surgeon:  Dr Wesley Grace    Procedure:  Robotic Radical Prostatectomy with Bilateral Pelvic Lymph Node Dissection  Date:  6/12/25  Facility: [facility]      Patient is cleared for proposed surgical procedure and anesthesia                         YES                        NO    Recommendations for surgery/anesthesia:____________________________________________________________________________________________________________________________________________________________________________________________________________________________________________________________________________________________________________________________________________________________________________________________________________________________________________________________________      Physician:(please Print)                                                                          Signature:                                                                                 Date:      Please return this form and any accompanying documentation to [facility].

## 2025-05-09 NOTE — TELEPHONE ENCOUNTER
Patient is scheduled for surgery with  on 6/12/25. Surgery consent to be done on arrival. Dr. Ashanti Mayorga APRN-CNP to clear. Patient states he/she does not follow with a cardiologist. Patient to do pre op fasting labs and ekg now. Pre op urine on 5/29/25. Surgery instructions  mailed to the patient.     Patient informed an adult over the age of 18 must be with them at the time of surgery and upon discharge

## 2025-05-09 NOTE — TELEPHONE ENCOUNTER
DO NOT TAKE  FISH OIL, MOBIC, IBUPROFEN, MOTRIN-LIKE DRUGS AND ANY MULTIVITAMINS OR OVER THE COUNTER SUPPLEMENTS 14 DAYS PRIOR TO SURGERY.    HOLD ASPIRIN 5 DAYS PRIOR TO SURGERY    IF YOU TAKE THE LISINOPRIL IN THE PM HOLD THE EVENING BEFORE SURGERY-IF YOU TAKE IN THE AM HOLD THE MORNING OF SURGERY     IF YOU TAKE GLUCOPHAGE, TRADJENTA, METFORMIN OR JANUMET, HOLD 2 DAYS PRIOR TO SURGERY    MUST HAVE AN ADULT OVER THE AGE OF 18 WITH YOU AT THE TIME OF THE DISCHARGE         Jerry Allison 1960     Surgical Physician: Dr. Grace      You have been scheduled for the procedure marked below:      Surgery: Robotic Assisted Radical Prostatectomy with Bilateral Pelvic Lymph Node Dissection         Date: 6/12/25     Anesthesia:  General     Place of Service: [facility] --Second Floor Same Day Surgery    CALL SURGERY DESK -[phone], THE DAY PRIOR TO SURGERY BETWEEN 8AM-4PM, FOR ARRIVAL TIME (IF SURGERY IS ON A MONDAY, CALL THE FRIDAY PRIOR)             INSTRUCTIONS AS MARKED BELOW:    1.  DO NOT eat or drink anything after midnight before surgery.   2.  We prefer you shower or bathe with an antibacterial soap (Dial) the morning of surgery.  3.  Plan to spend the overnight at the hospital the day of surgery  4.  Start the bowel prep the day before surgery on 6/11/25. Instructions included  5  Please bring a current medication list, photo ID and insurance card(s) with you  6. Okay to take Tylenol  7. Take blood pressure or heart medication as directed, if taken in the morning take with a small sip of water  8.The office will call you in 1-2 days after your procedure to schedule a follow up.    DATE SENSITIVE PRE OP TESTING:    TO AVOID YOUR SURGERY BEING CANCELLED, DO TESTING ON THE DATE LISTED (*WALK IN* NO APPOINTMENT NEEDED).      DO THE PRE OP FASTING  LABS AND EKG  NOW. ORDERS INCLUDED    DO THE PRE OP URINE CULTURE ON 5/29/25. ORDER INCLUDED        Date: 5/9/2025

## 2025-05-16 ENCOUNTER — PREP FOR PROCEDURE (OUTPATIENT)
Dept: UROLOGY | Age: 65
End: 2025-05-16

## 2025-05-16 ENCOUNTER — TELEPHONE (OUTPATIENT)
Dept: UROLOGY | Age: 65
End: 2025-05-16

## 2025-05-21 RX ORDER — SODIUM CHLORIDE 0.9 % (FLUSH) 0.9 %
5-40 SYRINGE (ML) INJECTION PRN
Status: CANCELLED | OUTPATIENT
Start: 2025-05-21

## 2025-05-21 RX ORDER — SODIUM CHLORIDE 0.9 % (FLUSH) 0.9 %
5-40 SYRINGE (ML) INJECTION EVERY 12 HOURS SCHEDULED
Status: CANCELLED | OUTPATIENT
Start: 2025-05-21

## 2025-05-21 RX ORDER — SODIUM CHLORIDE 9 MG/ML
INJECTION, SOLUTION INTRAVENOUS PRN
Status: CANCELLED | OUTPATIENT
Start: 2025-05-21

## 2025-06-03 ENCOUNTER — RESULTS FOLLOW-UP (OUTPATIENT)
Dept: UROLOGY | Age: 65
End: 2025-06-03

## 2025-06-03 RX ORDER — DOXYCYCLINE HYCLATE 100 MG
100 TABLET ORAL 2 TIMES DAILY
Qty: 20 TABLET | Refills: 0 | Status: SHIPPED | OUTPATIENT
Start: 2025-06-03 | End: 2025-06-13

## 2025-06-05 ENCOUNTER — HOSPITAL ENCOUNTER (OUTPATIENT)
Dept: PHYSICAL THERAPY | Age: 65
Setting detail: THERAPIES SERIES
Discharge: HOME | End: 2025-06-05
Payer: COMMERCIAL

## 2025-06-05 PROCEDURE — 97166 OT EVAL MOD COMPLEX 45 MIN: CPT

## 2025-06-05 PROCEDURE — 97110 THERAPEUTIC EXERCISES: CPT

## 2025-06-05 PROCEDURE — 97530 THERAPEUTIC ACTIVITIES: CPT

## 2025-06-05 NOTE — PROGRESS NOTES
PAT Call Date:  6/5   Surgery Date: 6/12    Surgeon: Sanjay   Surgery: prostatectomy+    Any Isolation Precautions? No      Type of Isolation Precaution: Not Applicable   Is patient from a nursing home? No  Name of Nursing Home:   Any equipment assist needed for moving patient? No   Type of Equipment: Not Applicable  Patient last weight: 238 lb     Hard Copy on Chart  In EPIC Pending/Notes   Consent -   Within 30 days; signed, dated & timed by patient and physician     [x] On Arrival     [] Blood      [] DNR   H&P -   Within 30 days    [x] Physician To Do     Clearance -    5/15  [x]Medical Mukesh-cleared     []Cardiac     [] Pulmonary    Orders -   Signed and Dated    5/21  [] Physician To Do    Labs -   Within 3 months   5/13 5/29  [x] CBC -ok   [x] BMP-ok   [x] GFR-ok   [] INR    [] PTT    [x] Urine +   [] Liver Enzymes    [] MRSA Nasal      Others:     Radiology Studies -   Within 1 year    [] Chest X-Ray   [] MRI    [] CT    [] Vascular   [] US     Pulmonary -     [] BRIAN   [] CPAP     Cardiac Workup -   Stress Test, Echo, Cath within 18 months  5/13  [x] EKG   -ok   [] Cath                 [] Stress Test                      [] Echo/ROSELYN   [] CABG   [] Holter Monitor      [] Pacemaker/ICD        Brand:        Where does patient have checked:         Last check:         Rep Notified:

## 2025-06-05 NOTE — PROGRESS NOTES
Follow all instructions given by your physician  If Urology case Call [phone] the weekday before procedure to find out Arrival Time.  Do not eat or drink anything after midnight prior to surgery(includes water, chewing gum, mints and ice chips)  Sips of water am of surgery with allowed medications  May brush teeth   Do not smoke or chew tobacco, drink alcoholic beverages or use any illicit drugs for 24 hours prior to surgery  Bring insurance info and photo ID  Bring pertinent paperwork with you from Doctor or surgeons's office  Wear clean comfortable, loose-fitting clothing  No make-up, nail polish, jewelry, piercings, or contact lenses to be worn day of surgery  No glue on dentures morning of surgery; you will be asked to remove them for surgery. Case for glasses.  Shower the night before and the morning of surgery with cleansing soap provided or a liquid antibacterial soap, dry with new fresh clean towel after each shower, no lotions, creams or powder.  Clean sheets and pillowcase on bed night before surgery  Bring rescue inhalers with you  Bring CPAP/BIPAP machine if you have one ( you may be charged if one is needed in recovery room )  No    If patient is a Hip or Knee Replacement, HOOS or KOOS is reviewed and documented.     Do you have a DNR?   Please Bring Healthcare Directive or Healthcare Power of  in so we can scan it into your chart.    [facility] to [facility] on 2nd floor  Make sure you have a responsible adult to drive you home after your surgery otherwise surgery will be cancelled.   You must have a responsible adult to stay with you overnight after your procedure.    If you would become ill prior to surgery, please call the surgeon right away.  We request that you limit to 2 visitors in pre-op area    Call [phone] for any questions    Our pharmacy has a Meds to Beds program where they will deliver any new prescriptions you may have to your room before you leave. Our Pharmacy will clear it  through your insurance; for example (same co pay). This enables you to take your new RX as soon as you need when you get home and avoids stop/wait delays on the way home.  Please have a form of payment with you and have someone designated as your Pharmacy contact with their phone # as you may not feel well or still be under the influence of anesthesia.    Please refer to the SSI-Surgical Site Infection Flyer you hopefully received in the mail-together we can prevent infections; signs and symptoms reviewed.  When discharged be sure you understand how to care for your wound and that you have the Dr./office phone # to call if you have any concerns or questions about your wound.

## 2025-06-05 NOTE — PROGRESS NOTES
PAT call attempted, patient unavailable, left message to please call us back at your earliest convenience; [phone]

## 2025-06-12 ENCOUNTER — ANESTHESIA EVENT (OUTPATIENT)
Dept: OPERATING ROOM | Age: 65
End: 2025-06-12
Payer: COMMERCIAL

## 2025-06-12 ENCOUNTER — ANESTHESIA (OUTPATIENT)
Dept: OPERATING ROOM | Age: 65
End: 2025-06-12
Payer: COMMERCIAL

## 2025-06-12 ENCOUNTER — TELEPHONE (OUTPATIENT)
Dept: UROLOGY | Age: 65
End: 2025-06-12

## 2025-06-12 ENCOUNTER — HOSPITAL ENCOUNTER (OUTPATIENT)
Age: 65
Setting detail: OUTPATIENT SURGERY
Discharge: HOME | End: 2025-06-12
Attending: UROLOGY | Admitting: UROLOGY
Payer: COMMERCIAL

## 2025-06-12 VITALS
OXYGEN SATURATION: 95 % | TEMPERATURE: 97.6 F | DIASTOLIC BLOOD PRESSURE: 64 MMHG | WEIGHT: 232 LBS | BODY MASS INDEX: 31.42 KG/M2 | HEIGHT: 72 IN | RESPIRATION RATE: 16 BRPM | HEART RATE: 55 BPM | SYSTOLIC BLOOD PRESSURE: 139 MMHG

## 2025-06-12 DIAGNOSIS — C61 PROSTATE CANCER (HCC): ICD-10-CM

## 2025-06-12 PROCEDURE — 2709999900 HC NON-CHARGEABLE SUPPLY: Performed by: UROLOGY

## 2025-06-12 PROCEDURE — 2500000003 HC RX 250 WO HCPCS: Performed by: UROLOGY

## 2025-06-12 PROCEDURE — 88307 TISSUE EXAM BY PATHOLOGIST: CPT

## 2025-06-12 PROCEDURE — S2900 ROBOTIC SURGICAL SYSTEM: HCPCS | Performed by: UROLOGY

## 2025-06-12 PROCEDURE — 7100000011 HC PHASE II RECOVERY - ADDTL 15 MIN: Performed by: UROLOGY

## 2025-06-12 PROCEDURE — 3600000019 HC SURGERY ROBOT ADDTL 15MIN: Performed by: UROLOGY

## 2025-06-12 PROCEDURE — C1889 IMPLANT/INSERT DEVICE, NOC: HCPCS | Performed by: UROLOGY

## 2025-06-12 PROCEDURE — 3600000009 HC SURGERY ROBOT BASE: Performed by: UROLOGY

## 2025-06-12 PROCEDURE — 6360000002 HC RX W HCPCS: Performed by: UROLOGY

## 2025-06-12 PROCEDURE — 3700000000 HC ANESTHESIA ATTENDED CARE: Performed by: UROLOGY

## 2025-06-12 PROCEDURE — 88309 TISSUE EXAM BY PATHOLOGIST: CPT

## 2025-06-12 PROCEDURE — 7100000001 HC PACU RECOVERY - ADDTL 15 MIN: Performed by: UROLOGY

## 2025-06-12 PROCEDURE — 2580000003 HC RX 258: Performed by: UROLOGY

## 2025-06-12 PROCEDURE — 2500000003 HC RX 250 WO HCPCS: Performed by: NURSE ANESTHETIST, CERTIFIED REGISTERED

## 2025-06-12 PROCEDURE — 6360000002 HC RX W HCPCS: Performed by: NURSE ANESTHETIST, CERTIFIED REGISTERED

## 2025-06-12 PROCEDURE — 3700000001 HC ADD 15 MINUTES (ANESTHESIA): Performed by: UROLOGY

## 2025-06-12 PROCEDURE — 7100000010 HC PHASE II RECOVERY - FIRST 15 MIN: Performed by: UROLOGY

## 2025-06-12 PROCEDURE — 7100000000 HC PACU RECOVERY - FIRST 15 MIN: Performed by: UROLOGY

## 2025-06-12 PROCEDURE — 2720000010 HC SURG SUPPLY STERILE: Performed by: UROLOGY

## 2025-06-12 DEVICE — IMPLANTABLE DEVICE: Type: IMPLANTABLE DEVICE | Site: PROSTATE | Status: FUNCTIONAL

## 2025-06-12 DEVICE — HEMOLOK L 6 CLIPS/CART
Type: IMPLANTABLE DEVICE | Status: FUNCTIONAL
Brand: WECK

## 2025-06-12 RX ORDER — SODIUM CHLORIDE 9 MG/ML
INJECTION, SOLUTION INTRAVENOUS PRN
Status: DISCONTINUED | OUTPATIENT
Start: 2025-06-12 | End: 2025-06-12 | Stop reason: HOSPADM

## 2025-06-12 RX ORDER — SULFAMETHOXAZOLE AND TRIMETHOPRIM 800; 160 MG/1; MG/1
TABLET ORAL
Qty: 10 TABLET | Refills: 0 | Status: SHIPPED | OUTPATIENT
Start: 2025-06-12

## 2025-06-12 RX ORDER — KETOROLAC TROMETHAMINE 10 MG/1
10 TABLET, FILM COATED ORAL EVERY 6 HOURS PRN
Qty: 20 TABLET | Refills: 0 | Status: SHIPPED | OUTPATIENT
Start: 2025-06-12

## 2025-06-12 RX ORDER — MIDAZOLAM HYDROCHLORIDE 1 MG/ML
INJECTION, SOLUTION INTRAMUSCULAR; INTRAVENOUS
Status: DISCONTINUED | OUTPATIENT
Start: 2025-06-12 | End: 2025-06-12 | Stop reason: SDUPTHER

## 2025-06-12 RX ORDER — OXYCODONE AND ACETAMINOPHEN 5; 325 MG/1; MG/1
1 TABLET ORAL EVERY 6 HOURS PRN
Qty: 20 TABLET | Refills: 0 | Status: SHIPPED | OUTPATIENT
Start: 2025-06-12 | End: 2025-06-17

## 2025-06-12 RX ORDER — FENTANYL CITRATE 50 UG/ML
INJECTION, SOLUTION INTRAMUSCULAR; INTRAVENOUS
Status: DISCONTINUED | OUTPATIENT
Start: 2025-06-12 | End: 2025-06-12 | Stop reason: SDUPTHER

## 2025-06-12 RX ORDER — ONDANSETRON 2 MG/ML
INJECTION INTRAMUSCULAR; INTRAVENOUS
Status: DISCONTINUED | OUTPATIENT
Start: 2025-06-12 | End: 2025-06-12 | Stop reason: SDUPTHER

## 2025-06-12 RX ORDER — DOCUSATE SODIUM 100 MG/1
100 CAPSULE, LIQUID FILLED ORAL DAILY PRN
Qty: 30 CAPSULE | Refills: 0 | Status: SHIPPED | OUTPATIENT
Start: 2025-06-12

## 2025-06-12 RX ORDER — LIDOCAINE HCL/PF 100 MG/5ML
SYRINGE (ML) INJECTION
Status: DISCONTINUED | OUTPATIENT
Start: 2025-06-12 | End: 2025-06-12 | Stop reason: SDUPTHER

## 2025-06-12 RX ORDER — ROCURONIUM BROMIDE 10 MG/ML
INJECTION, SOLUTION INTRAVENOUS
Status: DISCONTINUED | OUTPATIENT
Start: 2025-06-12 | End: 2025-06-12 | Stop reason: SDUPTHER

## 2025-06-12 RX ORDER — OXYBUTYNIN CHLORIDE 10 MG/1
10 TABLET, EXTENDED RELEASE ORAL DAILY
Qty: 30 TABLET | Refills: 0 | Status: SHIPPED | OUTPATIENT
Start: 2025-06-12

## 2025-06-12 RX ORDER — DEXAMETHASONE SODIUM PHOSPHATE 4 MG/ML
INJECTION, SOLUTION INTRA-ARTICULAR; INTRALESIONAL; INTRAMUSCULAR; INTRAVENOUS; SOFT TISSUE
Status: DISCONTINUED | OUTPATIENT
Start: 2025-06-12 | End: 2025-06-12 | Stop reason: SDUPTHER

## 2025-06-12 RX ORDER — OXYBUTYNIN CHLORIDE 5 MG/1
10 TABLET, EXTENDED RELEASE ORAL ONCE
Status: DISCONTINUED | OUTPATIENT
Start: 2025-06-12 | End: 2025-06-12 | Stop reason: HOSPADM

## 2025-06-12 RX ORDER — HYDROMORPHONE HYDROCHLORIDE 2 MG/ML
INJECTION, SOLUTION INTRAMUSCULAR; INTRAVENOUS; SUBCUTANEOUS
Status: DISCONTINUED | OUTPATIENT
Start: 2025-06-12 | End: 2025-06-12 | Stop reason: SDUPTHER

## 2025-06-12 RX ORDER — PROPOFOL 10 MG/ML
INJECTION, EMULSION INTRAVENOUS
Status: DISCONTINUED | OUTPATIENT
Start: 2025-06-12 | End: 2025-06-12 | Stop reason: SDUPTHER

## 2025-06-12 RX ORDER — SODIUM CHLORIDE 0.9 % (FLUSH) 0.9 %
5-40 SYRINGE (ML) INJECTION PRN
Status: DISCONTINUED | OUTPATIENT
Start: 2025-06-12 | End: 2025-06-12 | Stop reason: HOSPADM

## 2025-06-12 RX ORDER — SODIUM CHLORIDE 0.9 % (FLUSH) 0.9 %
5-40 SYRINGE (ML) INJECTION EVERY 12 HOURS SCHEDULED
Status: DISCONTINUED | OUTPATIENT
Start: 2025-06-12 | End: 2025-06-12 | Stop reason: HOSPADM

## 2025-06-12 RX ADMIN — FENTANYL CITRATE 100 MCG: 50 INJECTION, SOLUTION INTRAMUSCULAR; INTRAVENOUS at 12:49

## 2025-06-12 RX ADMIN — ROCURONIUM BROMIDE 30 MG: 10 INJECTION, SOLUTION INTRAVENOUS at 13:14

## 2025-06-12 RX ADMIN — DEXAMETHASONE SODIUM PHOSPHATE 10 MG: 4 INJECTION, SOLUTION INTRAMUSCULAR; INTRAVENOUS at 12:56

## 2025-06-12 RX ADMIN — ROCURONIUM BROMIDE 20 MG: 10 INJECTION, SOLUTION INTRAVENOUS at 14:01

## 2025-06-12 RX ADMIN — HYDROMORPHONE HYDROCHLORIDE 1 MG: 2 INJECTION INTRAMUSCULAR; INTRAVENOUS; SUBCUTANEOUS at 15:40

## 2025-06-12 RX ADMIN — Medication 100 MG: at 12:49

## 2025-06-12 RX ADMIN — ONDANSETRON 4 MG: 2 INJECTION, SOLUTION INTRAMUSCULAR; INTRAVENOUS at 15:02

## 2025-06-12 RX ADMIN — SUGAMMADEX 200 MG: 100 INJECTION, SOLUTION INTRAVENOUS at 15:25

## 2025-06-12 RX ADMIN — SODIUM CHLORIDE: 9 INJECTION, SOLUTION INTRAVENOUS at 14:55

## 2025-06-12 RX ADMIN — HYDROMORPHONE HYDROCHLORIDE 0.5 MG: 2 INJECTION INTRAMUSCULAR; INTRAVENOUS; SUBCUTANEOUS at 15:27

## 2025-06-12 RX ADMIN — WATER 2000 MG: 1 INJECTION INTRAMUSCULAR; INTRAVENOUS; SUBCUTANEOUS at 13:00

## 2025-06-12 RX ADMIN — PROPOFOL 200 MG: 10 INJECTION, EMULSION INTRAVENOUS at 12:49

## 2025-06-12 RX ADMIN — SUGAMMADEX 200 MG: 100 INJECTION, SOLUTION INTRAVENOUS at 15:28

## 2025-06-12 RX ADMIN — MIDAZOLAM 2 MG: 1 INJECTION INTRAMUSCULAR; INTRAVENOUS at 12:45

## 2025-06-12 RX ADMIN — SODIUM CHLORIDE: 9 INJECTION, SOLUTION INTRAVENOUS at 09:59

## 2025-06-12 RX ADMIN — ROCURONIUM BROMIDE 20 MG: 10 INJECTION, SOLUTION INTRAVENOUS at 14:43

## 2025-06-12 RX ADMIN — ROCURONIUM BROMIDE 50 MG: 10 INJECTION, SOLUTION INTRAVENOUS at 12:49

## 2025-06-12 RX ADMIN — HYDROMORPHONE HYDROCHLORIDE 0.5 MG: 2 INJECTION INTRAMUSCULAR; INTRAVENOUS; SUBCUTANEOUS at 15:34

## 2025-06-12 ASSESSMENT — PAIN SCALES - GENERAL
PAINLEVEL_OUTOF10: 0

## 2025-06-12 NOTE — ANESTHESIA POSTPROCEDURE EVALUATION
Department of Anesthesiology  Postprocedure Note    Patient: Jerry Allison  MRN: [id number]  YOB: 1960  Date of evaluation: 6/12/2025    Procedure Summary       Date: 06/12/25 Room / Location: [facility] OR 05 / [facility] OR    Anesthesia Start: 1245 Anesthesia Stop: 1540    Procedure: ROBOTIC LAPAROSCOPIC RADICAL PROSTECTOMY WITH BILATERAL PELVIC LYMPH NODE Diagnosis:       Prostate cancer (HCC)      (Prostate cancer (HCC) [C61])    Surgeons: Wesley Grace MD Responsible Provider: Escobar Ochoa MD    Anesthesia Type: general ASA Status: 2            Anesthesia Type: No value filed.    Fran Phase I: Fran Score: 8    Fran Phase II:      Anesthesia Post Evaluation    Patient location during evaluation: PACU  Patient participation: complete - patient participated  Level of consciousness: awake and alert  Airway patency: patent  Nausea & Vomiting: no vomiting and no nausea  Cardiovascular status: hemodynamically stable  Respiratory status: acceptable  Hydration status: stable  Pain management: adequate    No notable events documented.

## 2025-06-12 NOTE — PROGRESS NOTES
Pt returned to [facility] room 10. Vitals and assessment as charted. 0.9 infusing,  to count from PACU. Pt has crackers and water. Family at the bedside. Pt and family verbalized understanding of discharge criteria and call light use. Call light in reach.

## 2025-06-12 NOTE — PROGRESS NOTES
Patient admitted to [facility] room 10 with wife at bedside. Bed in low position side rails up call light in reach. Patient denies questions at this time. .    Ronda [phone]

## 2025-06-12 NOTE — TELEPHONE ENCOUNTER
Mr. Allison is status post RALRP with Bilateral Pelvic Lymphadenectomy. Plan is to discharge today. Please schedule appointment for cystogram and post-op visit per Dr. Grace's protocol.

## 2025-06-12 NOTE — BRIEF OP NOTE
Brief Postoperative Note      Patient: Jerry Allison  YOB: 1960  MRN: [id number]    Date of Procedure: 6/12/2025    Pre-Op Diagnosis Codes:      * Prostate cancer (HCC) [C61]    Post-Op Diagnosis: Same       Procedure(s):  ROBOTIC LAPAROSCOPIC RADICAL PROSTECTOMY WITH BILATERAL PELVIC LYMPH NODE    Surgeon(s):  Wesley Grace MD    Assistant:  Physician Assistant: Rebecca Ward PA-C    Anesthesia: General    Estimated Blood Loss (mL): less than 100 ml    Complications: None    Specimens:   ID Type Source Tests Collected by Time Destination   A : Prostate and Seminal Vessicles; Bilateral Lymph Nodes Tissue Prostate SURGICAL PATHOLOGY Wesley Grace MD 6/12/2025 1323        Implants:  Implant Name Type Inv. Item Serial No.  Lot No. LRB No. Used Action   CLIP INT L POLYMER DELANEY LIG HEM O DELANEY (6EA/PK) - [id number]  CLIP INT L POLYMER DELANEY LIG HEM O DELANEY (6EA/PK)  [employer]- [id number] N/A 1 Implanted   GRAFT HUM TISS L 4 X W 3 CM SZ 12 SQCM AMNION-CHORION-AMNION - [id number]  GRAFT HUM TISS L 4 X W 3 CM SZ 12 SQCM AMNION-CHORION-AMNION  [employer]- [id number] N/A 1 Implanted         Drains:   Urinary Catheter 06/12/25 Kelly (Active)   Catheter Indications Perioperative use for selected surgical procedures 06/12/25 1542   Site Assessment No urethral drainage 06/12/25 1542   Urine Color Bloody 06/12/25 1542   Urine Appearance Clear 06/12/25 1542   Collection Container Standard 06/12/25 1542   Catheter Best Practices  Drainage tube clipped to bed 06/12/25 1542   Status Draining 06/12/25 1542       Findings:  Infection Present At Time Of Surgery (PATOS) (choose all levels that have infection present):  No infection present  Other Findings: See dictated operative note    Electronically signed by Rebecca Ward PA-C on 6/12/2025 at 3:46 PM

## 2025-06-12 NOTE — PROGRESS NOTES
Pt has met discharge criteria and states he is ready for discharge to home. IV removed, gauze and tape applied. Dressed in own clothes and personal belongings gathered. Discharge instructions (with opioid medication education information) given to pt and family; pt and family verbalized understanding of discharge instructions, prescriptions and follow up appointments. Pt transported to discharge lobby by \[facility]\"" staff.

## 2025-06-12 NOTE — PROGRESS NOTES
1534- pt to pacu, resp easy and unlabored, VSS, pt medicated for pain per CRNA, pt appears in no acute distress  1550- pt resting in bed with eyes closed, resp easy and unlabored, VSS, pt appears in no acute distress, pt denies pain  1604- pt meets criteria for discharge from pacu, pt transported to [facility] in stable condition

## 2025-06-12 NOTE — DISCHARGE INSTRUCTIONS
No lifting, pushing, pulling >10 pounds.     Driving restrictions for 7-10 days and/or while on pain meds.     Call office if gutierrez catheter is not draining, bright red, thick blood in urine bag, temperature of 100.5 F on two occasions, incisional redness or purulent drainage, severe constipation, nausea/vomiting.     Go to ED if severe abdominal/ pelvic pain, chest pain, shortness of breath, no urine output, lethargy, dizziness/lightheadedness.     May shower, no baths.     Office will call you with an appointment for a cystogram and post-op follow-up.     Please be active. May walk up and down stairs.     Ten deep breaths every hours while awake.

## 2025-06-12 NOTE — ANESTHESIA PRE PROCEDURE
Department of Anesthesiology  Preprocedure Note       Name:  Jerry Allison   Age:  65 y.o.  :  1960                                          MRN:  [id number]         Date:  2025      Surgeon: Surgeon(s):  Wesley Grace MD    Procedure: Procedure(s):  ROBOTIC LAPAROSCOPIC RADICAL PROSTECTOMY WITH BILATERAL PELVIC LYMPH NODE    Medications prior to admission:   Prior to Admission medications    Medication Sig Start Date End Date Taking? Authorizing Provider   doxycycline hyclate (VIBRA-TABS) 100 MG tablet Take 1 tablet by mouth 2 times daily for 10 days 6/3/25 6/13/25 Yes Juan Fatima APRN - CNP   levothyroxine (SYNTHROID) 137 MCG tablet Take 1 tablet by mouth daily 25  Yes ProviderJacquelyn MD   Misc Natural Products (PROSTATE SUPPORT PO) Take 2 capsules by mouth daily Prostate [facility]   Yes ProviderJacquelyn MD   Glucosamine HCl (GLUCOSAMINE PO) Take by mouth daily   Yes ProviderJacquelyn MD   tamsulosin (FLOMAX) 0.4 MG capsule Take 1 capsule by mouth daily 22  Yes Mabel Siddiqi APRN - CNP   lisinopril (PRINIVIL;ZESTRIL) 20 MG tablet Take 1 tablet by mouth daily   Yes ProviderJacquelyn MD   pravastatin (PRAVACHOL) 40 MG tablet Take 1 tablet by mouth at bedtime   Yes Jacquelyn Archer MD   amLODIPine (NORVASC) 10 MG tablet Take 1 tablet by mouth daily   Yes ProviderJacquelyn MD   doxycycline hyclate (VIBRA-TABS) 100 MG tablet  24   ProviderJacquelyn MD       Current medications:    Current Facility-Administered Medications   Medication Dose Route Frequency Provider Last Rate Last Admin   • sodium chloride flush 0.9 % injection 5-40 mL  5-40 mL IntraVENous 2 times per day Wesley Grace MD       • sodium chloride flush 0.9 % injection 5-40 mL  5-40 mL IntraVENous PRN Wesley Grace MD       • 0.9 % sodium chloride infusion   IntraVENous PRN Wesley Grace  mL/hr at 25 0959 New Bag at 25 0959   •

## 2025-06-13 DIAGNOSIS — C61 PROSTATE CANCER (HCC): Primary | ICD-10-CM

## 2025-06-13 NOTE — TELEPHONE ENCOUNTER
Patient c/o abdominal distention and not passing flatus. He feels very bloated. He is taking gas x and colace.    Advised to back down his diet back to Clear liquids until passing flatus. May take MOM, miralax, and senokot.    He voiced understanding if he develops nausea and vomiting to be evaluated in the ER.    He will also continue to ambulate at home to promote flatus.

## 2025-06-14 ENCOUNTER — APPOINTMENT (OUTPATIENT)
Dept: CT IMAGING | Age: 65
End: 2025-06-14
Payer: COMMERCIAL

## 2025-06-14 ENCOUNTER — HOSPITAL ENCOUNTER (EMERGENCY)
Age: 65
Discharge: HOME | End: 2025-06-14
Payer: COMMERCIAL

## 2025-06-14 VITALS
DIASTOLIC BLOOD PRESSURE: 61 MMHG | SYSTOLIC BLOOD PRESSURE: 155 MMHG | TEMPERATURE: 97.6 F | HEART RATE: 49 BPM | RESPIRATION RATE: 16 BRPM | HEIGHT: 72 IN | BODY MASS INDEX: 31.15 KG/M2 | OXYGEN SATURATION: 92 % | WEIGHT: 230 LBS

## 2025-06-14 DIAGNOSIS — R14.0 ABDOMINAL DISTENTION: Primary | ICD-10-CM

## 2025-06-14 LAB
ALBUMIN SERPL BCG-MCNC: 3.9 G/DL (ref 3.4–4.9)
ALP SERPL-CCNC: 48 U/L (ref 40–129)
ALT SERPL W/O P-5'-P-CCNC: 22 U/L (ref 10–50)
ANION GAP SERPL CALC-SCNC: 12 MEQ/L (ref 8–16)
AST SERPL-CCNC: 18 U/L (ref 10–50)
BACTERIA URNS QL MICRO: ABNORMAL /HPF
BASOPHILS ABSOLUTE: 0.1 THOU/MM3 (ref 0–0.1)
BASOPHILS NFR BLD AUTO: 0.6 %
BILIRUB CONJ SERPL-MCNC: 0.2 MG/DL (ref 0–0.2)
BILIRUB SERPL-MCNC: 0.4 MG/DL (ref 0.3–1.2)
BILIRUB UR QL STRIP.AUTO: NEGATIVE
BUN SERPL-MCNC: 20 MG/DL (ref 8–23)
CALCIUM SERPL-MCNC: 9.1 MG/DL (ref 8.8–10.2)
CASTS #/AREA URNS LPF: ABNORMAL /LPF
CASTS 2: ABNORMAL /LPF
CHARACTER UR: CLEAR
CHLORIDE SERPL-SCNC: 100 MEQ/L (ref 98–111)
CO2 SERPL-SCNC: 25 MEQ/L (ref 22–29)
COLOR, UA: ABNORMAL
CREAT SERPL-MCNC: 1.1 MG/DL (ref 0.7–1.2)
CRYSTALS URNS MICRO: ABNORMAL
DEPRECATED RDW RBC AUTO: 45.9 FL (ref 35–45)
EKG ATRIAL RATE: 50 BPM
EKG P AXIS: 29 DEGREES
EKG P-R INTERVAL: 142 MS
EKG Q-T INTERVAL: 450 MS
EKG QRS DURATION: 118 MS
EKG QTC CALCULATION (BAZETT): 410 MS
EKG R AXIS: 17 DEGREES
EKG T AXIS: 44 DEGREES
EKG VENTRICULAR RATE: 50 BPM
EOSINOPHIL NFR BLD AUTO: 7.9 %
EOSINOPHILS ABSOLUTE: 1.1 THOU/MM3 (ref 0–0.4)
EPITHELIAL CELLS, UA: ABNORMAL /HPF
ERYTHROCYTE [DISTWIDTH] IN BLOOD BY AUTOMATED COUNT: 13.9 % (ref 11.5–14.5)
GFR SERPL CREATININE-BSD FRML MDRD: 75 ML/MIN/1.73M2
GLUCOSE SERPL-MCNC: 124 MG/DL (ref 74–109)
GLUCOSE UR QL STRIP.AUTO: NEGATIVE MG/DL
HCT VFR BLD AUTO: 42.3 % (ref 42–52)
HGB BLD-MCNC: 14.1 GM/DL (ref 14–18)
HGB UR QL STRIP.AUTO: ABNORMAL
IMM GRANULOCYTES # BLD AUTO: 0.03 THOU/MM3 (ref 0–0.07)
IMM GRANULOCYTES NFR BLD AUTO: 0.2 %
KETONES UR QL STRIP.AUTO: NEGATIVE
LIPASE SERPL-CCNC: 23 U/L (ref 13–60)
LYMPHOCYTES ABSOLUTE: 1.8 THOU/MM3 (ref 1–4.8)
LYMPHOCYTES NFR BLD AUTO: 13.9 %
MAGNESIUM SERPL-MCNC: 2.7 MG/DL (ref 1.6–2.6)
MCH RBC QN AUTO: 30.1 PG (ref 26–33)
MCHC RBC AUTO-ENTMCNC: 33.3 GM/DL (ref 32.2–35.5)
MCV RBC AUTO: 90.2 FL (ref 80–94)
MISCELLANEOUS 2: ABNORMAL
MONOCYTES ABSOLUTE: 1.2 THOU/MM3 (ref 0.4–1.3)
MONOCYTES NFR BLD AUTO: 8.8 %
NEUTROPHILS ABSOLUTE: 9.1 THOU/MM3 (ref 1.8–7.7)
NEUTROPHILS NFR BLD AUTO: 68.6 %
NITRITE UR QL STRIP: NEGATIVE
NRBC BLD AUTO-RTO: 0 /100 WBC
OSMOLALITY SERPL CALC.SUM OF ELEC: 277.9 MOSMOL/KG (ref 275–300)
PH UR STRIP.AUTO: 6.5 [PH] (ref 5–9)
PLATELET # BLD AUTO: 315 THOU/MM3 (ref 130–400)
PLATELET BLD QL SMEAR: ADEQUATE
PMV BLD AUTO: 9.5 FL (ref 9.4–12.4)
POTASSIUM SERPL-SCNC: 3.9 MEQ/L (ref 3.5–5.2)
PROT SERPL-MCNC: 6.6 G/DL (ref 6.4–8.3)
PROT UR STRIP.AUTO-MCNC: ABNORMAL MG/DL
RBC # BLD AUTO: 4.69 MILL/MM3 (ref 4.7–6.1)
RBC URINE: ABNORMAL /HPF
RENAL EPI CELLS #/AREA URNS HPF: ABNORMAL /[HPF]
SCAN OF BLOOD SMEAR: NORMAL
SODIUM SERPL-SCNC: 137 MEQ/L (ref 135–145)
SP GR UR REFRACT.AUTO: 1.01 (ref 1–1.03)
UROBILINOGEN, URINE: 0.2 EU/DL (ref 0–1)
WBC # BLD AUTO: 13.3 THOU/MM3 (ref 4.8–10.8)
WBC #/AREA URNS HPF: ABNORMAL /HPF
WBC #/AREA URNS HPF: ABNORMAL /[HPF]
YEAST LIKE FUNGI URNS QL MICRO: ABNORMAL

## 2025-06-14 PROCEDURE — 83735 ASSAY OF MAGNESIUM: CPT

## 2025-06-14 PROCEDURE — 85025 COMPLETE CBC W/AUTO DIFF WBC: CPT

## 2025-06-14 PROCEDURE — 82248 BILIRUBIN DIRECT: CPT

## 2025-06-14 PROCEDURE — 93010 ELECTROCARDIOGRAM REPORT: CPT | Performed by: INTERNAL MEDICINE

## 2025-06-14 PROCEDURE — 36415 COLL VENOUS BLD VENIPUNCTURE: CPT

## 2025-06-14 PROCEDURE — 6360000004 HC RX CONTRAST MEDICATION: Performed by: PHYSICIAN ASSISTANT

## 2025-06-14 PROCEDURE — 80053 COMPREHEN METABOLIC PANEL: CPT

## 2025-06-14 PROCEDURE — 93005 ELECTROCARDIOGRAM TRACING: CPT | Performed by: PHYSICIAN ASSISTANT

## 2025-06-14 PROCEDURE — 99285 EMERGENCY DEPT VISIT HI MDM: CPT

## 2025-06-14 PROCEDURE — 83690 ASSAY OF LIPASE: CPT

## 2025-06-14 PROCEDURE — 81001 URINALYSIS AUTO W/SCOPE: CPT

## 2025-06-14 PROCEDURE — 74177 CT ABD & PELVIS W/CONTRAST: CPT

## 2025-06-14 RX ORDER — IOPAMIDOL 755 MG/ML
80 INJECTION, SOLUTION INTRAVASCULAR
Status: COMPLETED | OUTPATIENT
Start: 2025-06-14 | End: 2025-06-14

## 2025-06-14 RX ADMIN — IOPAMIDOL 80 ML: 755 INJECTION, SOLUTION INTRAVENOUS at 07:51

## 2025-06-14 NOTE — ED PROVIDER NOTES
[facility] EMERGENCY DEPARTMENT      EMERGENCY MEDICINE     Pt Name: Jerry Allison  MRN: [id number]  Birthdate 1960  Date of evaluation: 6/14/2025  Provider: Татьяна Haile PA-C    CHIEF COMPLAINT       Chief Complaint   Patient presents with    Post-op Problem    Constipation     HISTORY OF PRESENT ILLNESS   Jrery Allison is a pleasant 65 y.o. male with a past medical history of hyperlipidemia, hypertension, melanoma, and prostate cancer with laparoscopic prostatectomy who presents to the emergency department for evaluation of bloating.  Patient states that he had his prostate removed laparoscopically by Dr. Grace 2 days ago.  Patient states that since then he feels like he is \"full of gas\" and his abdomen is distended.  Patient reports that he underwent a bowel prep prior to surgery and he completed this as prescribed.  He states that after surgery he was experiencing mild symptoms of bloating and therefore was not eating much.  He states that yesterday he contacted the urologist office and they advised him not to eat big meals, so he only ate a couple bites of scrambled eggs last night.  Patient states he was able to have a small soft bowel movement this morning which was nonbloody in nature.  He states that he has passed a small amount of gas.  He states that he only experiences abdominal pain when his belly is \"cramping\"and it only lasts for a couple of seconds.  Patient states he has had a Kelly since his surgery, he has not noticed any blood or abnormal urine in the bag.  Patient was prescribed Percocet and Toradol for pain control after the procedure, but states that he is only been taking the Toradol and not the Percocet.  Patient denies chest pain, shortness of breath, shoulder pain, fever, nausea, vomiting, and cough.    PASTMEDICAL HISTORY     Past Medical History:   Diagnosis Date    Hyperlipidemia     Hypertension     Melanoma (HCC) 03/2019    excision of back    Skin cancer

## 2025-06-14 NOTE — DISCHARGE INSTRUCTIONS
Utilize high-fiber diet to continue to keep bowels flush.    Continue to utilize movement and walks to help the area redistribute and resorb.  This can take a couple weeks to go away.  If it does get worse please return to the emergency department.    You have any other issues including fever, urinary pain also report back to the emergency department.    Please follow-up with urology as scheduled and your PCP as needed.

## 2025-06-14 NOTE — ED TRIAGE NOTES
Pt to ER due to inability to pass gas. He reports he had a robotic prostatectomy on Thursday and yesterday he wasn't able to pass gas. He reports he took miralax, senna, and milk of magnesia with no relief. Pt reports he did have a small bowel movement this morning around 0400. Pt states his abdomen is more distended than It has been.

## 2025-06-14 NOTE — ED NOTES
Upon first contact with patient this RN receives bedside shift report from TRENT Marie. Pt resting on cot with no distress.

## 2025-06-17 ENCOUNTER — APPOINTMENT (OUTPATIENT)
Dept: PHYSICAL THERAPY | Age: 65
End: 2025-06-17
Payer: COMMERCIAL

## 2025-06-19 ENCOUNTER — HOSPITAL ENCOUNTER (OUTPATIENT)
Dept: GENERAL RADIOLOGY | Age: 65
Discharge: HOME | End: 2025-06-19
Attending: UROLOGY
Payer: COMMERCIAL

## 2025-06-19 ENCOUNTER — OFFICE VISIT (OUTPATIENT)
Dept: UROLOGY | Age: 65
End: 2025-06-19

## 2025-06-19 VITALS — BODY MASS INDEX: 31.15 KG/M2 | HEIGHT: 72 IN | RESPIRATION RATE: 22 BRPM | WEIGHT: 230 LBS

## 2025-06-19 DIAGNOSIS — R97.20 ELEVATED PSA: ICD-10-CM

## 2025-06-19 DIAGNOSIS — C61 PROSTATE CANCER (HCC): ICD-10-CM

## 2025-06-19 DIAGNOSIS — C61 PROSTATE CANCER (HCC): Primary | ICD-10-CM

## 2025-06-19 DIAGNOSIS — N40.1 BENIGN LOCALIZED PROSTATIC HYPERPLASIA WITH LOWER URINARY TRACT SYMPTOMS (LUTS): ICD-10-CM

## 2025-06-19 PROCEDURE — 51600 INJECTION FOR BLADDER X-RAY: CPT

## 2025-06-19 PROCEDURE — 74430 CONTRAST X-RAY BLADDER: CPT

## 2025-06-19 PROCEDURE — 6360000004 HC RX CONTRAST MEDICATION: Performed by: UROLOGY

## 2025-06-19 PROCEDURE — 99024 POSTOP FOLLOW-UP VISIT: CPT | Performed by: NURSE PRACTITIONER

## 2025-06-19 RX ORDER — CIPROFLOXACIN 500 MG/1
500 TABLET, FILM COATED ORAL 2 TIMES DAILY
Qty: 6 TABLET | Refills: 0 | Status: SHIPPED | OUTPATIENT
Start: 2025-06-19 | End: 2025-06-22

## 2025-06-19 RX ADMIN — DIATRIZOATE MEGLUMINE 300 ML: 300 INJECTION, SOLUTION INTRAVENOUS at 10:27

## 2025-06-19 ASSESSMENT — ENCOUNTER SYMPTOMS
COUGH: 0
ABDOMINAL PAIN: 0
SHORTNESS OF BREATH: 0

## 2025-06-19 NOTE — PROGRESS NOTES
Patient has given me verbal consent to perform gutierrez removal  Yes    DIAGNOSIS/INDICATION:  ROBOTIC LAPAROSCOPIC RADICAL PROSTECTOMY WITH BILATERAL PELVIC LYMPH NODE     Per Devorah Ellis APRN 30 cc of water deflated from gutierrze balloon. 20 Fr straight gutierrez removed without difficulty.    Foreskin reduced back down?  Yes      Pt will drink fluids and call office before 4PM or report to ER in 6-8 hours if patient unable to urinate.      F/u with provider devorah ellis aprn-cnp      Patient was a post op ROBOTIC LAPAROSCOPIC RADICAL PROSTECTOMY WITH BILATERAL PELVIC LYMPH NODE  cath removal today, patient was informed if he is unable to urinate and has to report to ER to have them notify Urology Services prior to re-placing the gutierrez due to recent ROBOTIC LAPAROSCOPIC RADICAL PROSTECTOMY WITH BILATERAL PELVIC LYMPH NODE .

## 2025-06-19 NOTE — PATIENT INSTRUCTIONS
Cipro for 3 days twice a day starting today.   No lifting, pulling, pushing over 10 -20 pounds.   Showers okay, no soaking in water.     PSA in 6 weeks  Follow up with sherry in 6 weeks  Follow up with Dr Grace in 4 months    If urinating okay can stop flomax.  Oxybutynin can be used for urgency, frequency, if needed as long as able to urinate    If unable to urinate call office or go to ER inf its been more than 6 hours.

## 2025-06-19 NOTE — PROGRESS NOTES
[facility] [place] [facility]  [facility]  [address]  [address]  [facility] [address]  Dept: [phone]  Loc: [phone]    Visit Date: 6/19/2025        HPI:   Aden is a 65 year old male seen typically for prostate cancer.    S/p ROBOTIC LAPAROSCOPIC RADICAL PROSTECTOMY WITH BILATERAL PELVIC LYMPH NODE  6/12/25 with Sanjay.  Final Path:Prostate and bilateral pelvic lymph nodes, radical prostatectomy and lymphadenectomy:    Prostatic adenocarcinoma, acinar type, Belk score 3+4 = 7 (grade   group 2 of 5).     Luanne pattern 4 carcinoma comprises approximately 21-30% of the   neoplasia.    Cribriform Belk pattern 4 carcinoma is present.     Carcinoma involves approximately 6-10% of the submitted tissue.     Negative for extraprostatic extension, seminal vesicle invasion, margin involvement, and bladder neck invasion.     Three lymph nodes with no evidence of malignancy.  (0/3)         Nodular prostatic hyperplasia.         pT2, pN0     Cystogram 6/19/25 no leak.     Seen 6/19/25. Did go to ER due to bloating and gas 6/14/25 but this improved.  NO hematuria.  No dysuria.  Passing gas. Having BM daily for past few days.    No fevers, chills.  Eating well. Not taking antibiotic as finished it already. Not taking pain medicine. Incisions healing well. Discussed restrictions and post op expectations.  Kelly with yellow urine.       Current Outpatient Medications   Medication Sig Dispense Refill    ciprofloxacin (CIPRO) 500 MG tablet Take 1 tablet by mouth 2 times daily for 3 days 6 tablet 0    ketorolac (TORADOL) 10 MG tablet Take 1 tablet by mouth every 6 hours as needed for Pain 20 tablet 0    docusate sodium (COLACE) 100 MG capsule Take 1 capsule by mouth daily as needed for Constipation 30 capsule 0    sulfamethoxazole-trimethoprim (BACTRIM DS;SEPTRA DS) 800-160 MG per tablet Please take one tablet every 12 hours x 48 hours, then restart medication 24 hours prior to

## 2025-06-23 ENCOUNTER — HOSPITAL ENCOUNTER (OUTPATIENT)
Dept: PHYSICAL THERAPY | Age: 65
Setting detail: THERAPIES SERIES
Discharge: HOME | End: 2025-06-23
Payer: COMMERCIAL

## 2025-06-23 PROCEDURE — 97530 THERAPEUTIC ACTIVITIES: CPT

## 2025-06-23 PROCEDURE — 97110 THERAPEUTIC EXERCISES: CPT

## 2025-06-23 NOTE — PROGRESS NOTES
[facility]  OCCUPATIONAL THERAPY  OUTPATIENT REHABILITATION - SPECIALIZED THERAPY SERVICES  [x] PELVIC HEALTH RE EVALUATION  [] DAILY NOTE  [] PROGRESS NOTE [] DISCHARGE NOTE    Date: 2025  Patient Name:  Jerry Allison  : 1960  MRN: [id number]  CSN: [id number]    Referring Practitioner Wesley Grace MD [id number]      Diagnosis  Diagnoses       C61 (ICD-10-CM) - Prostate cancer (HCC)           Treatment Diagnosis N39.46 - Mixed Incontinence  M62.81 - Muscle Weakness (Generalized)  R39.15 - Urgency of Urination  R27.8 - Other Lack of Coordination   Date of Evaluation 25   Additional Pertinent History Jerry Allison has a past medical history of Hyperlipidemia, Hypertension, Melanoma (HCC), Skin cancer, and Thyroid disease.  he has a past surgical history that includes Coarctation of aorta repair (); Total Thyroidectomy (2018); Skin cancer excision; shoulder surgery (Left, 2022); Skin cancer excision; Facial Surgery (N/A, 2024); and Prostatectomy (N/A, 2025).  Trigger finger release right thumb ()    Allergies Allergies   Allergen Reactions    Pcn [Penicillins] Hives      Medications   Current Outpatient Medications:     ketorolac (TORADOL) 10 MG tablet, Take 1 tablet by mouth every 6 hours as needed for Pain, Disp: 20 tablet, Rfl: 0    docusate sodium (COLACE) 100 MG capsule, Take 1 capsule by mouth daily as needed for Constipation, Disp: 30 capsule, Rfl: 0    sulfamethoxazole-trimethoprim (BACTRIM DS;SEPTRA DS) 800-160 MG per tablet, Please take one tablet every 12 hours x 48 hours, then restart medication 24 hours prior to scheduled cystogram, Disp: 10 tablet, Rfl: 0    oxyBUTYnin (DITROPAN XL) 10 MG extended release tablet, Take 1 tablet by mouth daily, Disp: 30 tablet, Rfl: 0    levothyroxine (SYNTHROID) 137 MCG tablet, Take 1 tablet by mouth daily, Disp: , Rfl:     doxycycline hyclate (VIBRA-TABS) 100 MG tablet, , Disp: , Rfl:

## 2025-06-24 NOTE — H&P
History and Physical    Patient:  Jerry Allison  MRN: [id number]  YOB: 1960    CHIEF COMPLAINT:  Prostate cancer (HCC) [C61]      HISTORY OF PRESENT ILLNESS:   The patient is a 65 y.o. male who presents with as above, here for surgery    Patient's old records, notes and chart reviewed and summarized above.     Past Medical History:    Past Medical History:   Diagnosis Date    Hyperlipidemia     Hypertension     Melanoma (HCC) 03/2019    excision of back    Skin cancer     BCC, SCC    Thyroid disease        Past Surgical History:    Past Surgical History:   Procedure Laterality Date    COARCTATION OF AORTA REPAIR  1973    FACIAL SURGERY N/A 8/9/2024    MOHS Defect Repair SCC Left Dorsal Hand \"A\" and \"B\" with full thickness skin graft from left upper chest performed by Johann Cooper MD at [facility] OR    PROSTATECTOMY N/A 6/12/2025    ROBOTIC LAPAROSCOPIC RADICAL PROSTECTOMY WITH BILATERAL PELVIC LYMPH NODE performed by Wesley Grace MD at [facility] OR    SHOULDER SURGERY Left 03/01/2022    for frozen shoulder    SKIN CANCER EXCISION      back, nose and back of head    SKIN CANCER EXCISION      2/2023 on face    TOTAL THYROIDECTOMY  03/2018     Medications Prior to Admission:    Prior to Admission medications    Medication Sig Start Date End Date Taking? Authorizing Provider   ketorolac (TORADOL) 10 MG tablet Take 1 tablet by mouth every 6 hours as needed for Pain 6/12/25  Yes Rebecca Ward PA-C   docusate sodium (COLACE) 100 MG capsule Take 1 capsule by mouth daily as needed for Constipation 6/12/25  Yes Rebecca Ward PA-C   sulfamethoxazole-trimethoprim (BACTRIM DS;SEPTRA DS) 800-160 MG per tablet Please take one tablet every 12 hours x 48 hours, then restart medication 24 hours prior to scheduled cystogram 6/12/25  Yes Rebecca Ward PA-C   oxyBUTYnin (DITROPAN XL) 10 MG extended release tablet Take 1 tablet by mouth daily 6/12/25  Yes Rebecca Ward

## 2025-06-24 NOTE — OP NOTE
Operative Note      Patient: Jerry Allison  YOB: 1960  MRN: [id number]    Date of Procedure: 6/12/2025    Pre-Op Diagnosis Codes:      * Prostate cancer (HCC) [C61]    Post-Op Diagnosis: Same       Procedure(s):  ROBOTIC LAPAROSCOPIC RADICAL PROSTECTOMY WITH BILATERAL PELVIC LYMPH NODE  Implantation of biologic implant for soft tissue reinforcement- nerve wrap CPT 15433-35  Bilateral Abdominal wall TAP block- CPT 07715      Surgeon(s):  Wesley Grace MD    Assistant:   Physician Assistant: Rebecca Ward PA-C    Anesthesia: General    Estimated Blood Loss (mL): Minimal    Complications: None    Specimens:   ID Type Source Tests Collected by Time Destination   A : Prostate and Seminal Vessicles; Bilateral Lymph Nodes Tissue Prostate SURGICAL PATHOLOGY Wesley Grace MD 6/12/2025 1323        Implants:  Implant Name Type Inv. Item Serial No.  Lot No. LRB No. Used Action   CLIP INT L POLYMER DELANEY LIG HEM O DELANEY (6EA/PK) - [id number]  CLIP INT L POLYMER DELANEY LIG HEM O DELANEY (6EA/PK)  [employer]- [id number] N/A 1 Implanted   GRAFT HUM TISS L 4 X W 3 CM SZ 12 SQCM AMNION-CHORION-AMNION - [id number]  GRAFT HUM TISS L 4 X W 3 CM SZ 12 SQCM AMNION-CHORION-AMNION  [employer]- [id number] N/A 1 Implanted         Drains:   [REMOVED] Urinary Catheter 06/12/25 Kelly (Removed)   Catheter Indications Perioperative use for selected surgical procedures 06/12/25 1736   Site Assessment No urethral drainage 06/12/25 1736   Urine Color Bloody;Yellow 06/12/25 1736   Urine Appearance Clear 06/12/25 1736   Collection Container Standard 06/12/25 1736   Catheter Best Practices  Drainage tube clipped to bed;Catheter secured to thigh;Bag below bladder;Tamper seal intact;Lack of dependent loop in tubing;Bag not on floor;Drainage bag less than half full 06/12/25 1736   Status Draining 06/12/25 1736   Output (mL) 500 mL 06/12/25 1736       Findings:  Infection Present At Time Of Surgery

## 2025-06-26 ENCOUNTER — TELEPHONE (OUTPATIENT)
Dept: UROLOGY | Age: 65
End: 2025-06-26

## 2025-06-26 NOTE — TELEPHONE ENCOUNTER
Patient is due for colonoscopy.     How soon can he have that completed since he underwent ROBOTIC LAPAROSCOPIC RADICAL PROSTECTOMY WITH BILATERAL PELVIC LYMPH NODE on 06/12/2025?

## 2025-06-26 NOTE — TELEPHONE ENCOUNTER
Can discuss at follow up with Mabel as it should not be completed before that.  Typically wait a few months

## 2025-06-30 ENCOUNTER — APPOINTMENT (OUTPATIENT)
Dept: PHYSICAL THERAPY | Age: 65
End: 2025-06-30
Payer: COMMERCIAL

## 2025-07-09 RX ORDER — OXYBUTYNIN CHLORIDE 10 MG/1
10 TABLET, EXTENDED RELEASE ORAL DAILY
Qty: 30 TABLET | Refills: 0 | Status: SHIPPED | OUTPATIENT
Start: 2025-07-09

## 2025-07-09 RX ORDER — DOCUSATE SODIUM 100 MG/1
100 CAPSULE, LIQUID FILLED ORAL DAILY PRN
Qty: 30 CAPSULE | Refills: 0 | Status: SHIPPED | OUTPATIENT
Start: 2025-07-09

## 2025-07-09 NOTE — TELEPHONE ENCOUNTER
Jerry Allison called requesting a refill on the following medications:  Requested Prescriptions     Pending Prescriptions Disp Refills    oxyBUTYnin (DITROPAN-XL) 10 MG extended release tablet [Pharmacy Med Name: OXYBUTYNIN CL ER 10 MG TABLET] 30 tablet 0     Sig: TAKE 1 TABLET BY MOUTH EVERY DAY    docusate sodium (COLACE) 100 MG capsule [Pharmacy Med Name: DOCUSATE SODIUM 100 MG SOFTGEL] 30 capsule 0     Sig: TAKE 1 CAPSULE BY MOUTH DAILY AS NEEDED FOR CONSTIPATION.     Pharmacy verified:  .[email or web address]      Date of last visit: 06/19/2025  Date of next visit (if applicable): 8/5/2025

## 2025-07-14 ENCOUNTER — HOSPITAL ENCOUNTER (OUTPATIENT)
Dept: PHYSICAL THERAPY | Age: 65
Setting detail: THERAPIES SERIES
Discharge: HOME | End: 2025-07-14
Payer: COMMERCIAL

## 2025-07-14 PROCEDURE — 97530 THERAPEUTIC ACTIVITIES: CPT

## 2025-07-14 PROCEDURE — 97140 MANUAL THERAPY 1/> REGIONS: CPT

## 2025-07-14 PROCEDURE — 97110 THERAPEUTIC EXERCISES: CPT

## 2025-07-14 NOTE — PROGRESS NOTES
* PLEASE SIGN, DATE AND TIME CERTIFICATION BELOW AND RETURN TO [facility] OUTPATIENT REHABILITATION (FAX #: [phone]).  ATTEST/CO-SIGN IF ACCESSING VIA [facility].  THANK YOU.**    I certify that I have examined the patient below and determined that Physical Medicine and Rehabilitation service is necessary and that I approve the established plan of care for up to 90 days or as specifically noted.  Attestation, signature or co-signature of physician indicates approval of certification requirements.    ________________________ ____________  Physician Signature   Date    [facility]  OCCUPATIONAL THERAPY  OUTPATIENT REHABILITATION - SPECIALIZED THERAPY SERVICES  [] PELVIC HEALTH RE EVALUATION  [] DAILY NOTE  [x] PROGRESS NOTE [] DISCHARGE NOTE    Date: 2025  Patient Name:  Jerry Allison  : 1960  MRN: [id number]  CSN: [id number]    Referring Practitioner Wesley Grace MD [id number]      Diagnosis  Diagnoses       C61 (ICD-10-CM) - Prostate cancer (HCC)           Treatment Diagnosis N39.46 - Mixed Incontinence  M62.81 - Muscle Weakness (Generalized)  R39.15 - Urgency of Urination  R27.8 - Other Lack of Coordination   Date of Evaluation 25   Additional Pertinent History Jerry Allison has a past medical history of Hyperlipidemia, Hypertension, Melanoma (HCC), Skin cancer, and Thyroid disease.  he has a past surgical history that includes Coarctation of aorta repair (); Total Thyroidectomy (2018); Skin cancer excision; shoulder surgery (Left, 2022); Skin cancer excision; Facial Surgery (N/A, 2024); and Prostatectomy (N/A, 2025).  Trigger finger release right thumb ()    Allergies Allergies   Allergen Reactions    Pcn [Penicillins] Hives      Medications   Current Outpatient Medications:     oxyBUTYnin (DITROPAN-XL) 10 MG extended release tablet, TAKE 1 TABLET BY MOUTH EVERY DAY, Disp: 30 tablet, Rfl: 0    docusate sodium (COLACE) 100 MG capsule,

## 2025-07-28 ENCOUNTER — HOSPITAL ENCOUNTER (OUTPATIENT)
Dept: PHYSICAL THERAPY | Age: 65
Setting detail: THERAPIES SERIES
Discharge: HOME | End: 2025-07-28
Payer: COMMERCIAL

## 2025-07-28 PROCEDURE — 97530 THERAPEUTIC ACTIVITIES: CPT

## 2025-07-28 PROCEDURE — 97140 MANUAL THERAPY 1/> REGIONS: CPT

## 2025-07-28 PROCEDURE — 97110 THERAPEUTIC EXERCISES: CPT

## 2025-07-28 NOTE — PROGRESS NOTES
[Paused] Misc Natural Products (PROSTATE SUPPORT PO), Take 2 capsules by mouth daily Prostate [employer] [facility], Disp: , Rfl:     [Paused] Glucosamine HCl (GLUCOSAMINE PO), Take by mouth daily (Patient taking differently: Take by mouth daily paused), Disp: , Rfl:     tamsulosin (FLOMAX) 0.4 MG capsule, Take 1 capsule by mouth daily, Disp: 90 capsule, Rfl: 4    lisinopril (PRINIVIL;ZESTRIL) 20 MG tablet, Take 1 tablet by mouth daily, Disp: , Rfl:     pravastatin (PRAVACHOL) 40 MG tablet, Take 1 tablet by mouth at bedtime, Disp: , Rfl:     amLODIPine (NORVASC) 10 MG tablet, Take 1 tablet by mouth daily, Disp: , Rfl:       Functional Outcome Measure Used JERILYN, IIQ   Functional Outcome Score JERILYN: 0, IIQ: 0 (6/5/25); JERILYN: 7, IIQ: 8 (6/23/25); JERILYN: 7, IIQ: 7 (7/14/25)       Insurance Primary: Payor: [employer] /  /  / ,   Secondary:    Authorization Information PRE CERTIFICATION REQUIRED: AUTHORIZATION REQUIRED AFTER EVALUATION.   Received auth for 4 OT visits from 6/30/25 through 9/28/25   INSURANCE THERAPY BENEFIT: Allowed 20 visits of OT, PT, and ST EACH per calendar year.  0 visits have been used. Hard Max.   AQUATIC THERAPY COVERED: Yes  MODALITIES COVERED:  Yes   Initial CPT Codes Requested 97110-Therapeutic Exercise,  97140-Manual Therapy, 97530-Therapeutic Activities, 42268-DV ADL Training, 34218-[place] Re-Education, 97035-Ultrasound, and 97014-Electrical Stimulation Unattended   Progress Note Counter 4, 1/4 for auth, 1/10 for progress note (Reporting Period: 6/5/25 to present)   Recertification Date 09/12/25   Physician Follow-Up Post op    Physician Orders Eval and treat   History of Present Illness Pt reports to skilled OT services with c/o prostate CA that was discovered via PSA and biopsy that he is scheduled to have a RALP on June 12, 2025. Pt reports that at times he will have urgency with urination if he waits too long. Pt denies any constipation. Pt reports that he has had some pain on the L

## 2025-08-05 ENCOUNTER — OFFICE VISIT (OUTPATIENT)
Dept: UROLOGY | Age: 65
End: 2025-08-05

## 2025-08-05 VITALS
DIASTOLIC BLOOD PRESSURE: 64 MMHG | BODY MASS INDEX: 31.64 KG/M2 | SYSTOLIC BLOOD PRESSURE: 122 MMHG | WEIGHT: 233.6 LBS | HEIGHT: 72 IN

## 2025-08-05 DIAGNOSIS — N52.9 ERECTILE DYSFUNCTION, UNSPECIFIED ERECTILE DYSFUNCTION TYPE: Primary | ICD-10-CM

## 2025-08-05 DIAGNOSIS — C61 PROSTATE CANCER (HCC): ICD-10-CM

## 2025-08-05 PROCEDURE — 99024 POSTOP FOLLOW-UP VISIT: CPT | Performed by: NURSE PRACTITIONER

## 2025-08-05 RX ORDER — DOXYCYCLINE HYCLATE 100 MG
100 TABLET ORAL 2 TIMES DAILY
Qty: 20 TABLET | Refills: 0 | Status: SHIPPED | OUTPATIENT
Start: 2025-08-05 | End: 2025-08-15

## 2025-08-05 RX ORDER — TADALAFIL 5 MG/1
5 TABLET ORAL DAILY
Qty: 90 TABLET | Refills: 2 | Status: SHIPPED | OUTPATIENT
Start: 2025-08-05 | End: 2025-08-08

## 2025-08-05 ASSESSMENT — ENCOUNTER SYMPTOMS
ABDOMINAL PAIN: 0
VOMITING: 0
NAUSEA: 0
BACK PAIN: 0

## 2025-08-06 RX ORDER — OXYBUTYNIN CHLORIDE 10 MG/1
10 TABLET, EXTENDED RELEASE ORAL DAILY
Qty: 90 TABLET | Refills: 1 | Status: SHIPPED | OUTPATIENT
Start: 2025-08-06

## 2025-08-14 ENCOUNTER — HOSPITAL ENCOUNTER (OUTPATIENT)
Dept: PHYSICAL THERAPY | Age: 65
Setting detail: THERAPIES SERIES
Discharge: HOME | End: 2025-08-14
Payer: COMMERCIAL

## 2025-08-14 PROCEDURE — 97530 THERAPEUTIC ACTIVITIES: CPT

## 2025-08-14 PROCEDURE — 97110 THERAPEUTIC EXERCISES: CPT

## (undated) DEVICE — ROBOTIC PROSTATE: Brand: MEDLINE INDUSTRIES, INC.

## (undated) DEVICE — GLOVE SURG SZ 6 THK91MIL LTX FREE SYN POLYISOPRENE ANTI

## (undated) DEVICE — INSUFFLATION NEEDLE TO ESTABLISH PNEUMOPERITONEUM.: Brand: INSUFFLATION NEEDLE

## (undated) DEVICE — SEAL

## (undated) DEVICE — AIRSEAL 12 MM ACCESS PORT AND PALM GRIP OBTURATOR WITH BLADELESS OPTICAL TIP, 120 MM LENGTH: Brand: AIRSEAL

## (undated) DEVICE — SUTURE MONOCRYL SZ 4-0 L18IN ABSRB UD P-3 L13MM 3/8 CIR PRIM Y494G

## (undated) DEVICE — COTTON BALL ST

## (undated) DEVICE — HYPODERMIC SAFETY NEEDLE: Brand: MAGELLAN

## (undated) DEVICE — 40586 ADVANCED TRENDELENBURG POSITIONING KIT: Brand: 40586 ADVANCED TRENDELENBURG POSITIONING KIT

## (undated) DEVICE — SEALANT TISS 10 ML FIBRIN VISTASEAL

## (undated) DEVICE — KIT DETRGNT ENZYMATIC SOLUTION 100 ML SOAK SHLD 5 VI LG HUMD

## (undated) DEVICE — PACK PROCEDURE SURG PLAS SC MIN SRHP LF

## (undated) DEVICE — BLADELESS OBTURATOR: Brand: WECK VISTA

## (undated) DEVICE — COLUMN DRAPE

## (undated) DEVICE — ELECTRODE PT RET AD L9FT HI MOIST COND ADH HYDRGEL CORDED

## (undated) DEVICE — APPLICATOR LAP 45CM FLX 2 VISTASEAL

## (undated) DEVICE — SYRINGE MED 30ML STD CLR PLAS LUERLOCK TIP N CTRL DISP

## (undated) DEVICE — ARM DRAPE

## (undated) DEVICE — BANDAGE,GAUZE,4.5"X4.1YD,STERILE,LF: Brand: MEDLINE

## (undated) DEVICE — TISSUE RETRIEVAL SYSTEM: Brand: INZII RETRIEVAL SYSTEM

## (undated) DEVICE — GLOVE SURG SZ 8 L11.77IN FNGR THK9.8MIL STRW LTX POLYMER

## (undated) DEVICE — TROCAR: Brand: KII FIOS FIRST ENTRY

## (undated) DEVICE — GLOVE ORANGE PI 7   MSG9070

## (undated) DEVICE — Device

## (undated) DEVICE — TIP COVER ACCESSORY